# Patient Record
Sex: FEMALE | Race: WHITE | Employment: PART TIME | URBAN - METROPOLITAN AREA
[De-identification: names, ages, dates, MRNs, and addresses within clinical notes are randomized per-mention and may not be internally consistent; named-entity substitution may affect disease eponyms.]

---

## 2019-08-01 ENCOUNTER — HOSPITAL ENCOUNTER (EMERGENCY)
Age: 24
Discharge: HOME | End: 2019-08-01
Payer: COMMERCIAL

## 2019-08-01 VITALS
HEIGHT: 66 IN | RESPIRATION RATE: 16 BRPM | OXYGEN SATURATION: 98 % | HEART RATE: 73 BPM | SYSTOLIC BLOOD PRESSURE: 120 MMHG | TEMPERATURE: 98.3 F | BODY MASS INDEX: 43.39 KG/M2 | DIASTOLIC BLOOD PRESSURE: 81 MMHG | WEIGHT: 270 LBS

## 2019-08-01 DIAGNOSIS — M54.50 ACUTE RIGHT-SIDED LOW BACK PAIN WITHOUT SCIATICA: Primary | ICD-10-CM

## 2019-08-01 PROCEDURE — 99282 EMERGENCY DEPT VISIT SF MDM: CPT

## 2019-08-01 RX ORDER — LIDOCAINE 50 MG/G
1 PATCH TOPICAL DAILY
Qty: 30 PATCH | Refills: 0 | Status: SHIPPED | OUTPATIENT
Start: 2019-08-01 | End: 2020-09-25

## 2019-08-01 RX ORDER — NAPROXEN 500 MG/1
500 TABLET ORAL 2 TIMES DAILY
Qty: 20 TABLET | Refills: 0 | Status: SHIPPED | OUTPATIENT
Start: 2019-08-01 | End: 2019-09-06

## 2019-08-01 RX ORDER — CYCLOBENZAPRINE HCL 10 MG
10 TABLET ORAL 3 TIMES DAILY PRN
Qty: 20 TABLET | Refills: 0 | Status: SHIPPED | OUTPATIENT
Start: 2019-08-01 | End: 2019-08-11

## 2019-08-01 ASSESSMENT — PAIN DESCRIPTION - LOCATION: LOCATION: BACK

## 2019-08-01 ASSESSMENT — ENCOUNTER SYMPTOMS
VOMITING: 0
BACK PAIN: 1
BLOOD IN STOOL: 0
SHORTNESS OF BREATH: 0
DIARRHEA: 0
ABDOMINAL PAIN: 0
NAUSEA: 0

## 2019-08-01 ASSESSMENT — PAIN DESCRIPTION - PAIN TYPE: TYPE: ACUTE PAIN

## 2019-08-01 ASSESSMENT — PAIN DESCRIPTION - ORIENTATION: ORIENTATION: LOWER;MID

## 2019-08-01 ASSESSMENT — PAIN SCALES - GENERAL: PAINLEVEL_OUTOF10: 6

## 2019-08-01 NOTE — ED PROVIDER NOTES
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    HPI:  (Location, Duration, Timing, Severity,Quality, Assoc Sx, Context, Modifying factors)  This is a  26 y.o. female that presents to the emergency department with a chief complaint of right low back pain that began 2 days ago. 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 States shortly after work she began noticing some pain in her back that is worse with certain movements. Rates the pain a 6 out of 10. Denies loss of control of bowel or bladder function, dysuria, hematuria, difficulty urinating, diarrhea, bloody stool. Denies any history of diabetes, kidney disease, recent instrumentation or surgery or history of IV drug use. Denies vomiting, fevers or any other symptoms. Denies any previous issues with her back. PastMedical/Surgical History:  History reviewed. No pertinent past medical history. History reviewed. No pertinent surgical history. Medications:  Discharge Medication List as of 8/1/2019 12:26 PM            Review of Systems:  Review of Systems   Constitutional: Negative for chills and fever. Respiratory: Negative for shortness of breath. Cardiovascular: Negative for chest pain. Gastrointestinal: Negative for abdominal pain, blood in stool, diarrhea, nausea and vomiting. Genitourinary: Negative for difficulty urinating, dysuria and hematuria.

## 2019-09-06 ENCOUNTER — HOSPITAL ENCOUNTER (EMERGENCY)
Age: 24
Discharge: HOME | End: 2019-09-06
Payer: COMMERCIAL

## 2019-09-06 ENCOUNTER — APPOINTMENT (OUTPATIENT)
Dept: GENERAL RADIOLOGY | Age: 24
End: 2019-09-06
Payer: COMMERCIAL

## 2019-09-06 VITALS
SYSTOLIC BLOOD PRESSURE: 120 MMHG | HEART RATE: 96 BPM | HEIGHT: 66 IN | DIASTOLIC BLOOD PRESSURE: 86 MMHG | WEIGHT: 270 LBS | OXYGEN SATURATION: 98 % | TEMPERATURE: 98.2 F | RESPIRATION RATE: 18 BRPM | BODY MASS INDEX: 43.39 KG/M2

## 2019-09-06 DIAGNOSIS — M25.571 CHRONIC PAIN OF RIGHT ANKLE: Primary | ICD-10-CM

## 2019-09-06 DIAGNOSIS — G89.29 CHRONIC PAIN OF RIGHT ANKLE: Primary | ICD-10-CM

## 2019-09-06 PROCEDURE — 99283 EMERGENCY DEPT VISIT LOW MDM: CPT

## 2019-09-06 PROCEDURE — 73610 X-RAY EXAM OF ANKLE: CPT

## 2019-09-06 RX ORDER — NAPROXEN 500 MG/1
500 TABLET ORAL 2 TIMES DAILY PRN
Qty: 20 TABLET | Refills: 0 | Status: SHIPPED | OUTPATIENT
Start: 2019-09-06 | End: 2020-09-25

## 2019-09-06 ASSESSMENT — ENCOUNTER SYMPTOMS
DIARRHEA: 0
ABDOMINAL PAIN: 0
COLOR CHANGE: 0
NAUSEA: 0
CHEST TIGHTNESS: 0
SHORTNESS OF BREATH: 0
VOMITING: 0

## 2019-09-06 ASSESSMENT — PAIN SCALES - GENERAL: PAINLEVEL_OUTOF10: 6

## 2019-09-06 ASSESSMENT — PAIN DESCRIPTION - ORIENTATION: ORIENTATION: RIGHT

## 2019-09-06 ASSESSMENT — PAIN DESCRIPTION - LOCATION: LOCATION: ANKLE

## 2019-09-06 ASSESSMENT — PAIN DESCRIPTION - PAIN TYPE: TYPE: CHRONIC PAIN

## 2019-09-06 NOTE — ED PROVIDER NOTES
**EVALUATED BY ADVANCED PRACTICE PROVIDER**        [address]      Pt Name: Nia Silver  [facility]:[id number]  [place] 1995  Date of evaluation: 9/6/2019  Provider: Samira Downey PA-C      Chief Complaint:    Chief Complaint   Patient presents with    Ankle Pain     right ankle pain x1 month, dneies injury, worse with ambulating       Nursing Notes, Past Medical Hx, Past Surgical Hx, Social Hx, Allergies, and Family Hx were all reviewed and agreed with or any disagreements were addressed in the HPI.    HPI:  (Location, Duration, Timing, Severity,Quality, Assoc Sx, Context, Modifying factors)  This is a  26 y.o. female presents the emergency department difficulty as a pertains to her right ankle. Patient states that she denies that she is had injury or problems with this ankle until approximately 6 weeks ago. She states she has noted persistently over the last month that she has had some achy pain and discomfort. She states it is diffusely over the ankle. She states that it is worse with ambulation. She states sometimes when she has a sensation of increasing levels of pain it feels like if she popped her ankle I feel better. Unfortunately she states that she pops her ankle and it feels worse. She cannot discern where exactly the pain is coming from. She states that is all over. She has pain medially, over the tibiotalar joint, and reports that she has pain laterally. She has not had any evidence of significant ankle joint effusion per her report. She states the symptoms are intermittent. She has not tried medications in the home environment has been wearing a ankle wrap with no benefit. Current level of pain and discomfort rates to be 6 out of 10. Because of the above-mentioned she presents for evaluation and treatment. PastMedical/Surgical History:  History reviewed. No pertinent past medical history. History reviewed.  No lateral malleolus. 1. No acute abnormality. MEDICAL DECISION MAKING / ED COURSE:      PROCEDURES:   Procedures  None    Patient was given:  Medications - No data to display    The patient's detailed history of present illness is documented as above. Upon arrival to the emergency department the patient's vital signs are as documented. The patient is noted to be hemodynamically stable and afebrile. Physical examination findings are as above. Radiographs of the patient's right ankle demonstrate no evidence of acute bony abnormality. On physical exam there is nothing focal.  She unfortunately is been having symptoms that have been persistent for more than a month. I suggested initiation with anti-inflammatories to see if we can control the symptoms related to the pain and then to make arrangements for orthopedic follow-up on an outpatient basis. She is aware there are number of conditions that can exist that we are not seen on plain radiographs that can cause pain and discomfort. She will be given a referral to Dr. Warren Mart. She is walking without significant antalgia and I do not believe that she needs crutches. She can continue with the ankle wrap believes it is beneficial. The patient has been made aware of the signs and symptoms which would necessitate an immediate return to the emergency department and verbalizes an understanding of these signs and symptoms. .I estimate there is low risk for compartment syndrome, deep venous thrombosis, limb-threatening infectious, tendon and/or neurovascular injury and therefore I consider the discharge disposition reasonable. [facility] and I have discussed the diagnosis and risks, and we agree with discharging home to follow-up with their primary care provider and/or the referring orthopedist on call. We also discussed returning to the emergency department immediately if new or worsening symptoms occur.  We have discussed the symptoms which are most

## 2019-10-25 ENCOUNTER — HOSPITAL ENCOUNTER (EMERGENCY)
Age: 24
Discharge: HOME | End: 2019-10-25
Payer: COMMERCIAL

## 2019-10-25 VITALS
RESPIRATION RATE: 18 BRPM | BODY MASS INDEX: 43.39 KG/M2 | HEIGHT: 66 IN | SYSTOLIC BLOOD PRESSURE: 145 MMHG | DIASTOLIC BLOOD PRESSURE: 86 MMHG | HEART RATE: 92 BPM | WEIGHT: 270 LBS | OXYGEN SATURATION: 98 % | TEMPERATURE: 98.2 F

## 2019-10-25 DIAGNOSIS — J20.9 ACUTE BRONCHITIS, UNSPECIFIED ORGANISM: Primary | ICD-10-CM

## 2019-10-25 LAB
RAPID INFLUENZA  B AGN: NEGATIVE
RAPID INFLUENZA A AGN: NEGATIVE

## 2019-10-25 PROCEDURE — 99283 EMERGENCY DEPT VISIT LOW MDM: CPT

## 2019-10-25 PROCEDURE — 6370000000 HC RX 637 (ALT 250 FOR IP): Performed by: PHYSICIAN ASSISTANT

## 2019-10-25 PROCEDURE — 94760 N-INVAS EAR/PLS OXIMETRY 1: CPT

## 2019-10-25 PROCEDURE — 94640 AIRWAY INHALATION TREATMENT: CPT

## 2019-10-25 PROCEDURE — 87804 INFLUENZA ASSAY W/OPTIC: CPT

## 2019-10-25 RX ORDER — ALBUTEROL SULFATE 90 UG/1
2 AEROSOL, METERED RESPIRATORY (INHALATION) EVERY 4 HOURS PRN
Qty: 1 INHALER | Refills: 0 | Status: SHIPPED | OUTPATIENT
Start: 2019-10-25 | End: 2020-09-25

## 2019-10-25 RX ORDER — IPRATROPIUM BROMIDE AND ALBUTEROL SULFATE 2.5; .5 MG/3ML; MG/3ML
1 SOLUTION RESPIRATORY (INHALATION) ONCE
Status: COMPLETED | OUTPATIENT
Start: 2019-10-25 | End: 2019-10-25

## 2019-10-25 RX ORDER — BENZONATATE 100 MG/1
100 CAPSULE ORAL 3 TIMES DAILY PRN
Qty: 30 CAPSULE | Refills: 0 | Status: SHIPPED | OUTPATIENT
Start: 2019-10-25 | End: 2019-11-01

## 2019-10-25 RX ADMIN — IPRATROPIUM BROMIDE AND ALBUTEROL SULFATE 1 AMPULE: .5; 3 SOLUTION RESPIRATORY (INHALATION) at 11:57

## 2019-10-25 ASSESSMENT — ENCOUNTER SYMPTOMS
BACK PAIN: 0
TROUBLE SWALLOWING: 0
RHINORRHEA: 1
SHORTNESS OF BREATH: 0
VOMITING: 0
STRIDOR: 0
WHEEZING: 0
DIARRHEA: 0
COUGH: 1
SORE THROAT: 0
ABDOMINAL PAIN: 0
ABDOMINAL DISTENTION: 0
CONSTIPATION: 0
COLOR CHANGE: 0
NAUSEA: 0
VOICE CHANGE: 0

## 2019-12-11 ENCOUNTER — APPOINTMENT (OUTPATIENT)
Dept: GENERAL RADIOLOGY | Age: 24
End: 2019-12-11
Payer: COMMERCIAL

## 2019-12-11 ENCOUNTER — HOSPITAL ENCOUNTER (EMERGENCY)
Age: 24
Discharge: HOME | End: 2019-12-11
Payer: COMMERCIAL

## 2019-12-11 VITALS
WEIGHT: 284 LBS | DIASTOLIC BLOOD PRESSURE: 84 MMHG | TEMPERATURE: 98.2 F | SYSTOLIC BLOOD PRESSURE: 126 MMHG | BODY MASS INDEX: 45.84 KG/M2 | RESPIRATION RATE: 16 BRPM | HEART RATE: 89 BPM | OXYGEN SATURATION: 98 %

## 2019-12-11 DIAGNOSIS — R07.81 RIB PAIN ON LEFT SIDE: Primary | ICD-10-CM

## 2019-12-11 PROCEDURE — 93005 ELECTROCARDIOGRAM TRACING: CPT | Performed by: EMERGENCY MEDICINE

## 2019-12-11 PROCEDURE — 71046 X-RAY EXAM CHEST 2 VIEWS: CPT

## 2019-12-11 PROCEDURE — 99283 EMERGENCY DEPT VISIT LOW MDM: CPT

## 2019-12-11 RX ORDER — NAPROXEN 500 MG/1
500 TABLET ORAL 2 TIMES DAILY
Qty: 20 TABLET | Refills: 0 | Status: SHIPPED | OUTPATIENT
Start: 2019-12-11 | End: 2020-09-25

## 2019-12-11 RX ORDER — LIDOCAINE 50 MG/G
1 PATCH TOPICAL DAILY
Qty: 30 PATCH | Refills: 0 | Status: SHIPPED | OUTPATIENT
Start: 2019-12-11 | End: 2021-03-14

## 2019-12-11 ASSESSMENT — PAIN DESCRIPTION - LOCATION: LOCATION: RIB CAGE

## 2019-12-11 ASSESSMENT — PAIN SCALES - GENERAL: PAINLEVEL_OUTOF10: 6

## 2019-12-11 ASSESSMENT — PAIN DESCRIPTION - ORIENTATION: ORIENTATION: LEFT

## 2019-12-12 LAB
EKG ATRIAL RATE: 80 BPM
EKG DIAGNOSIS: NORMAL
EKG P AXIS: 26 DEGREES
EKG P-R INTERVAL: 158 MS
EKG Q-T INTERVAL: 370 MS
EKG QRS DURATION: 76 MS
EKG QTC CALCULATION (BAZETT): 426 MS
EKG R AXIS: 41 DEGREES
EKG T AXIS: 43 DEGREES
EKG VENTRICULAR RATE: 80 BPM

## 2019-12-12 PROCEDURE — 93010 ELECTROCARDIOGRAM REPORT: CPT | Performed by: INTERNAL MEDICINE

## 2020-06-16 ENCOUNTER — HOSPITAL ENCOUNTER (EMERGENCY)
Age: 25
Discharge: HOME | End: 2020-06-16
Attending: EMERGENCY MEDICINE
Payer: COMMERCIAL

## 2020-06-16 VITALS
DIASTOLIC BLOOD PRESSURE: 81 MMHG | HEIGHT: 66 IN | BODY MASS INDEX: 45 KG/M2 | SYSTOLIC BLOOD PRESSURE: 139 MMHG | HEART RATE: 81 BPM | OXYGEN SATURATION: 98 % | WEIGHT: 280 LBS | RESPIRATION RATE: 20 BRPM | TEMPERATURE: 97.3 F

## 2020-06-16 LAB
BILIRUBIN URINE: NEGATIVE
BLOOD, URINE: NEGATIVE
CLARITY: ABNORMAL
COLOR: YELLOW
EPITHELIAL CELLS, UA: 2 /HPF (ref 0–5)
GLUCOSE URINE: NEGATIVE MG/DL
HCG(URINE) PREGNANCY TEST: NEGATIVE
HYALINE CASTS: 0 /LPF (ref 0–8)
KETONES, URINE: NEGATIVE MG/DL
LEUKOCYTE ESTERASE, URINE: NEGATIVE
MICROSCOPIC EXAMINATION: YES
NITRITE, URINE: NEGATIVE
PH UA: 5.5 (ref 5–8)
PROTEIN UA: NEGATIVE MG/DL
RBC UA: 2 /HPF (ref 0–4)
SPECIFIC GRAVITY UA: 1.02 (ref 1–1.03)
URINE TYPE: ABNORMAL
UROBILINOGEN, URINE: 0.2 E.U./DL
WBC UA: 2 /HPF (ref 0–5)

## 2020-06-16 PROCEDURE — 99283 EMERGENCY DEPT VISIT LOW MDM: CPT

## 2020-06-16 PROCEDURE — 84703 CHORIONIC GONADOTROPIN ASSAY: CPT

## 2020-06-16 PROCEDURE — 81001 URINALYSIS AUTO W/SCOPE: CPT

## 2020-06-16 RX ORDER — AMOXICILLIN 250 MG
1 CAPSULE ORAL DAILY PRN
Qty: 7 TABLET | Refills: 0 | Status: SHIPPED | OUTPATIENT
Start: 2020-06-16 | End: 2020-06-23

## 2020-06-16 RX ORDER — MAGNESIUM CARB/ALUMINUM HYDROX 105-160MG
150 TABLET,CHEWABLE ORAL 2 TIMES DAILY PRN
Qty: 2 BOTTLE | Refills: 0 | Status: SHIPPED | OUTPATIENT
Start: 2020-06-16 | End: 2020-06-18

## 2020-06-16 ASSESSMENT — PAIN DESCRIPTION - ORIENTATION: ORIENTATION: LEFT

## 2020-06-16 ASSESSMENT — PAIN DESCRIPTION - LOCATION: LOCATION: ABDOMEN

## 2020-06-16 NOTE — ED NOTES
Discharge instructions given, patient acknowledged understanding, rx given x2, patient ambulated out of ed upon discharge with no wants or needs      Nicole Wright RN  06/16/20 4006

## 2020-08-11 ENCOUNTER — HOSPITAL ENCOUNTER (EMERGENCY)
Age: 25
Discharge: HOME | End: 2020-08-11
Attending: EMERGENCY MEDICINE
Payer: COMMERCIAL

## 2020-08-11 VITALS
RESPIRATION RATE: 14 BRPM | HEIGHT: 65 IN | OXYGEN SATURATION: 98 % | SYSTOLIC BLOOD PRESSURE: 128 MMHG | WEIGHT: 280 LBS | HEART RATE: 80 BPM | BODY MASS INDEX: 46.65 KG/M2 | DIASTOLIC BLOOD PRESSURE: 74 MMHG | TEMPERATURE: 98.2 F

## 2020-08-11 PROCEDURE — 6360000002 HC RX W HCPCS: Performed by: EMERGENCY MEDICINE

## 2020-08-11 PROCEDURE — 96372 THER/PROPH/DIAG INJ SC/IM: CPT

## 2020-08-11 PROCEDURE — 99282 EMERGENCY DEPT VISIT SF MDM: CPT

## 2020-08-11 RX ORDER — KETOROLAC TROMETHAMINE 30 MG/ML
30 INJECTION, SOLUTION INTRAMUSCULAR; INTRAVENOUS ONCE
Status: COMPLETED | OUTPATIENT
Start: 2020-08-11 | End: 2020-08-11

## 2020-08-11 RX ADMIN — KETOROLAC TROMETHAMINE 30 MG: 30 INJECTION, SOLUTION INTRAMUSCULAR at 06:33

## 2020-08-11 ASSESSMENT — PAIN SCALES - GENERAL: PAINLEVEL_OUTOF10: 8

## 2020-08-11 NOTE — ED PROVIDER NOTES
%    Place 1 patch onto the skin daily 12 hours on, 12 hours off. LORATADINE-PSEUDOEPHEDRINE (CLARITIN-D 12HR) 5-120 MG PER EXTENDED RELEASE TABLET    Take 1 tablet by mouth 2 times daily    NAPROXEN (NAPROSYN) 500 MG TABLET    Take 1 tablet by mouth 2 times daily as needed for Pain    NAPROXEN (NAPROSYN) 500 MG TABLET    Take 1 tablet by mouth 2 times daily for 20 doses       ALLERGIES     Patient has no known allergies. FAMILY HISTORY     History reviewed. No pertinent family history.        SOCIAL HISTORY       Social History     Socioeconomic History    Marital status: Single     Spouse name: None    Number of children: None    Years of education: None    Highest education level: None   Occupational History    None   Social Needs    Financial resource strain: None    Food insecurity     Worry: None     Inability: None    Transportation needs     Medical: None     Non-medical: None   Tobacco Use    Smoking status: Never Smoker    Smokeless tobacco: Never Used   Substance and Sexual Activity    Alcohol use: Not Currently    Drug use: Never    Sexual activity: None   Lifestyle    Physical activity     Days per week: None     Minutes per session: None    Stress: None   Relationships    Social connections     Talks on phone: None     Gets together: None     Attends Sikh service: None     Active member of club or organization: None     Attends meetings of clubs or organizations: None     Relationship status: None    Intimate partner violence     Fear of current or ex partner: None     Emotionally abused: None     Physically abused: None     Forced sexual activity: None   Other Topics Concern    None   Social History Narrative    None       SCREENINGS           PHYSICAL EXAM    (5+ for level 4, 8+ for level 5)     ED Triage Vitals [08/11/20 0611]   BP Temp Temp Source Pulse Resp SpO2 Height Weight   128/74 98.2 °F (36.8 °C) Oral 80 14 98 % 5' 5\" (1.651 m) 280 lb (127 kg)       Physical Exam  Vitals signs and nursing note reviewed. Constitutional:       General: She is not in acute distress. Appearance: Normal appearance. She is not toxic-appearing or diaphoretic. HENT:      Head: Normocephalic and atraumatic. Right Ear: External ear normal.      Left Ear: External ear normal.      Nose: Nose normal.      Mouth/Throat:      Mouth: Mucous membranes are moist.      Pharynx: Oropharynx is clear. Eyes:      General: No scleral icterus. Right eye: No discharge. Left eye: No discharge. Extraocular Movements: Extraocular movements intact. Conjunctiva/sclera: Conjunctivae normal.      Pupils: Pupils are equal, round, and reactive to light. Neck:      Musculoskeletal: Normal range of motion and neck supple. Cardiovascular:      Pulses: Normal pulses. Pulmonary:      Effort: Pulmonary effort is normal. No respiratory distress. Breath sounds: Normal breath sounds. No stridor. Abdominal:      General: Abdomen is flat. There is no distension. Musculoskeletal: Normal range of motion. General: No swelling, tenderness, deformity or signs of injury. Right lower leg: No edema. Left lower leg: No edema. Skin:     General: Skin is warm and dry. Capillary Refill: Capillary refill takes less than 2 seconds. Coloration: Skin is not jaundiced or pale. Findings: Rash (Pink patch of rash likely a chemical burn-first-degree without blistering underneath both armpits.) present. No bruising, erythema or lesion. Neurological:      General: No focal deficit present. Mental Status: She is alert and oriented to person, place, and time. Cranial Nerves: No cranial nerve deficit. Sensory: No sensory deficit. Motor: No weakness. Psychiatric:         Mood and Affect: Mood normal.         Behavior: Behavior normal.         Thought Content:  Thought content normal.         Judgment: Judgment normal.         DIAGNOSTIC RESULTS RADIOLOGY (Per Emergency Physician): Interpretation per the Radiologist below, if available at the time of this note:  No results found. LABS:  Labs Reviewed - No data to display    All other labs were within normal range or not returned as of this dictation. EMERGENCY DEPARTMENT COURSE and DIFFERENTIAL DIAGNOSIS/MDM:   Vitals:    Vitals:    08/11/20 0611   BP: 128/74   Pulse: 80   Resp: 14   Temp: 98.2 °F (36.8 °C)   TempSrc: Oral   SpO2: 98%   Weight: 280 lb (127 kg)   Height: 5' 5\" (1.651 m)       Medications   ketorolac (TORADOL) injection 30 mg (30 mg Intramuscular Given 8/11/20 4379)       MDM. Patient likely with a chemical burn from a depilatory cream.  Patient is already irrigated and washed off with soap and water. Recommend that she continue using Neosporin. She is reporting pain here as a Toradol to be given. Recommend NSAID and Tylenol at home. Patient given strict return precautions. Instructed not to use the cream anymore. Patient instructed to follow up with their primary care doctor in one-two days and return to the emergency department if worsening of the condition or any other concerns. Please see discharge instructions for further delineation regarding the specific discharge instructions explained and given to the patient. CONSULTS:  None    PROCEDURES:  Unless otherwise noted below, none     Procedures    FINAL IMPRESSION      1.  Corrosion of first degree of axilla, unspecified laterality, initial encounter          DISPOSITION/PLAN   DISPOSITION Decision To Discharge 08/11/2020 06:27:48 AM      PATIENT REFERRED TO:  Manolo Gregory MD  [address] [facility] [address]  [facility]  [phone]    Schedule an appointment as soon as possible for a visit       [facility] Emergency Department  [address] [place] [address]  [phone]    If symptoms worsen      DISCHARGE MEDICATIONS:  New Prescriptions    No medications on file (Please note:  Portions of this note were completed with a voice recognition program. Efforts were made to edit the dictations but occasionally words and phrases are mis-transcribed.)    Form v2016. J.[id number]    Hudson Brown DO (electronically signed)  Emergency Medicine Provider              Russell Calzada DO  08/11/20 8102

## 2020-09-25 ENCOUNTER — HOSPITAL ENCOUNTER (EMERGENCY)
Age: 25
Discharge: HOME | End: 2020-09-25
Payer: COMMERCIAL

## 2020-09-25 ENCOUNTER — APPOINTMENT (OUTPATIENT)
Dept: GENERAL RADIOLOGY | Age: 25
End: 2020-09-25
Payer: COMMERCIAL

## 2020-09-25 VITALS
RESPIRATION RATE: 16 BRPM | SYSTOLIC BLOOD PRESSURE: 125 MMHG | HEIGHT: 66 IN | OXYGEN SATURATION: 100 % | HEART RATE: 82 BPM | DIASTOLIC BLOOD PRESSURE: 57 MMHG | WEIGHT: 290 LBS | TEMPERATURE: 97.9 F | BODY MASS INDEX: 46.61 KG/M2

## 2020-09-25 PROCEDURE — 73110 X-RAY EXAM OF WRIST: CPT

## 2020-09-25 PROCEDURE — 99283 EMERGENCY DEPT VISIT LOW MDM: CPT

## 2020-09-25 PROCEDURE — 6370000000 HC RX 637 (ALT 250 FOR IP): Performed by: NURSE PRACTITIONER

## 2020-09-25 RX ORDER — NAPROXEN 500 MG/1
500 TABLET ORAL 2 TIMES DAILY PRN
Qty: 60 TABLET | Refills: 0 | Status: SHIPPED | OUTPATIENT
Start: 2020-09-25 | End: 2021-01-22

## 2020-09-25 RX ORDER — IBUPROFEN 800 MG/1
800 TABLET ORAL ONCE
Status: COMPLETED | OUTPATIENT
Start: 2020-09-25 | End: 2020-09-25

## 2020-09-25 RX ADMIN — IBUPROFEN 800 MG: 800 TABLET, FILM COATED ORAL at 19:48

## 2020-09-25 ASSESSMENT — PAIN DESCRIPTION - PAIN TYPE: TYPE: ACUTE PAIN

## 2020-09-25 ASSESSMENT — PAIN DESCRIPTION - ORIENTATION: ORIENTATION: LEFT

## 2020-09-25 ASSESSMENT — PAIN DESCRIPTION - LOCATION: LOCATION: WRIST

## 2020-09-25 ASSESSMENT — PAIN SCALES - GENERAL
PAINLEVEL_OUTOF10: 7
PAINLEVEL_OUTOF10: 7

## 2020-09-25 ASSESSMENT — ENCOUNTER SYMPTOMS
NAUSEA: 0
ABDOMINAL PAIN: 0
CHEST TIGHTNESS: 0
DIARRHEA: 0
SHORTNESS OF BREATH: 0
VOMITING: 0

## 2020-09-25 ASSESSMENT — PAIN DESCRIPTION - DESCRIPTORS: DESCRIPTORS: ACHING;THROBBING

## 2020-09-25 NOTE — ED PROVIDER NOTES
[facility]        Pt Name: Kuwlinder Jacobs  MRN: [id number]  [place] 1995  Date of evaluation: 9/25/2020  Provider: SOCORRO Jordan - CNP  PCP: Sandra Barcenas MD    ROSIE. I have evaluated this patient. My supervising physician was available for consultation. CHIEF COMPLAINT       Chief Complaint   Patient presents with    Wrist Pain     Pt says,\" hurt left wrist 10 years had sx and I have a pin and today I twisted it at work. \"       HISTORY OF PRESENT ILLNESS   (Location, Timing/Onset, Context/Setting, Quality, Duration, Modifying Factors, Severity, Associated Signs and Symptoms)  Note limiting factors. Kulwinder Jacobs is a 27 y.o. female who presents to the emergency department with complaint of left wrist pain that started this morning. Patient describes the pain as a constant aching. Patient believes that she may have twisted it at work but is unsure. Reports having a pin placement into the left wrist 10 years ago and states that the pain has been off and on since then. Patient denies taking any over-the-counter medications at home for pain relief but has tried a wrist brace and states that has helped alleviate the pain \"a little bit\". Patient reports that moving the wrist makes the pain worse. Patient reports that she has never had physical therapy for the wrist and believes that this may be contributing to why she has had pain off and on for the past 10 years since the surgery. Denies any headache, fever, lightheadedness, dizziness, visual disturbances. No chest pain or pressure. No neck or back pain. No shortness of breath, cough, or congestion. No abdominal pain, nausea, vomiting, diarrhea, constipation, or dysuria. No rash. Nursing Notes were all reviewed and agreed with or any disagreements were addressed in the HPI.     REVIEW OF SYSTEMS    (2-9 systems for level 4, 10 or more for level 5)     Review of Systems   Constitutional: Negative for activity change, chills and fever. Respiratory: Negative for chest tightness and shortness of breath. Cardiovascular: Negative for chest pain. Gastrointestinal: Negative for abdominal pain, diarrhea, nausea and vomiting. Genitourinary: Negative for dysuria. Musculoskeletal:        Wrist pain   All other systems reviewed and are negative. Positives and Pertinent negatives as per HPI. Except as noted above in the ROS, all other systems were reviewed and negative. PAST MEDICAL HISTORY     Past Medical History:   Diagnosis Date    Kidney stone          SURGICAL HISTORY     Past Surgical History:   Procedure Laterality Date    WRIST SURGERY      pin palcement         CURRENTMEDICATIONS       Discharge Medication List as of 9/25/2020  8:24 PM      CONTINUE these medications which have NOT CHANGED    Details   lidocaine (LIDODERM) 5 % Place 1 patch onto the skin daily 12 hours on, 12 hours off., Disp-30 patch, R-0Print               ALLERGIES     Patient has no known allergies. FAMILYHISTORY     History reviewed. No pertinent family history. SOCIAL HISTORY       Social History     Tobacco Use    Smoking status: Never Smoker    Smokeless tobacco: Never Used   Substance Use Topics    Alcohol use: Not Currently    Drug use: Never       SCREENINGS             PHYSICAL EXAM    (up to 7 for level 4, 8 or more for level 5)     ED Triage Vitals   BP Temp Temp Source Pulse Resp SpO2 Height Weight   09/25/20 1922 09/25/20 1918 09/25/20 1918 09/25/20 1918 09/25/20 1918 09/25/20 1918 09/25/20 1918 09/25/20 1918   (!) 125/57 97.9 °F (36.6 °C) Oral 82 16 100 % 5' 6\" (1.676 m) 290 lb (131.5 kg)       Physical Exam  Vitals signs and nursing note reviewed. Constitutional:       Appearance: She is well-developed. She is not diaphoretic. HENT:      Head: Normocephalic and atraumatic.       Right Ear: External ear normal.      Left Ear: m)        Patient was given the following medications:  Medications   ibuprofen (ADVIL;MOTRIN) tablet 800 mg (800 mg Oral Given 9/25/20 1948)           Briefly, this is a 22-year-old female who presents to the emergency department with complaint of left wrist pain that started this morning. Patient describes the pain as a constant aching. Patient believes that she may have twisted it at work but is unsure. Reports having a pin placement into the left wrist 10 years ago and states that the pain has been off and on since then. Patient denies taking any over-the-counter medications at home for pain relief but has tried a wrist brace and states that has helped alleviate the pain \"a little bit\". Patient reports that moving the wrist makes the pain worse. Patient reports that she has never had physical therapy for the wrist and believes that this may be contributing to why she has had pain off and on for the past 10 years since the surgery. Xray left wrist shows no acute osseous injury of the left wrist.     Patient updated on x-ray results. Given referral for physical therapy, naproxen for pain and inflammation, L wrist brace for comfort, and follow-up with orthopedic as needed. Patient agreeable to plan at this time. I estimate there is LOW risk for FRACTURE, COMPARTMENT SYNDROME, DEEP VENOUS THROMBOSIS, SEPTIC ARTHRITIS, TENDON OR NEUROVASCULAR INJURY, thus I consider the discharge disposition reasonable. FINAL IMPRESSION      1.  Left wrist pain          DISPOSITION/PLAN   DISPOSITION Decision To Discharge 09/25/2020 08:21:10 PM      PATIENT REFERREDTO:  Diandra Jorge MD  [address] [place] [address], [address]  [phone]    Schedule an appointment as soon as possible for a visit   As needed      DISCHARGE MEDICATIONS:  Discharge Medication List as of 9/25/2020  8:24 PM          DISCONTINUED MEDICATIONS:  Discharge Medication List as of 9/25/2020  8:24 PM      STOP taking these medications       loratadine-pseudoephedrine (CLARITIN-D 12HR) 5-120 MG per extended release tablet Comments:   Reason for Stopping:         albuterol sulfate HFA (PROVENTIL HFA) 108 (90 Base) MCG/ACT inhaler Comments:   Reason for Stopping:                      (Please note that portions of this note were completed with a voice recognition program.  Efforts were made to edit the dictations but occasionally words are mis-transcribed.)    SOCORRO Quijano CNP (electronically signed)         SOCORRO Quijano CNP  09/25/20 3903

## 2020-09-26 NOTE — ED NOTES
Nursing Discharge Notes:  -Patient discharged at this time in no acute distress after verbalizing understanding of discharge instructions.  -A copy of the AVS was reviewed with pt.  -Pt received applicable scripts which were reviewed with pt by this RN. -Pt was given the opportunity to ask questions before signing for discharge.  -left brace applied.     -Pt left ambulatory to lobby / discharge area. Patient Education:  Learner - Patient. Motivation and Readiness To Learn - Medium to High  Barriers To Learning - None  Learning Preference / Provided Instructions - Both written and verbal discharge instructions.        Mila Nixon RN  09/25/20 8080

## 2020-10-06 ENCOUNTER — HOSPITAL ENCOUNTER (OUTPATIENT)
Dept: OCCUPATIONAL THERAPY | Age: 25
Setting detail: THERAPIES SERIES
Discharge: HOME | End: 2020-10-06
Payer: COMMERCIAL

## 2020-10-06 PROCEDURE — 97022 WHIRLPOOL THERAPY: CPT

## 2020-10-06 PROCEDURE — 97165 OT EVAL LOW COMPLEX 30 MIN: CPT

## 2020-10-06 PROCEDURE — 97530 THERAPEUTIC ACTIVITIES: CPT

## 2020-10-06 NOTE — PROGRESS NOTES
Exercise Program:   Patient instructed in use of kinesiotape for mechanical correction of wrist , patient verbalized understanding after demonstration provided. Patient also educated to complete short arc arom into supination of forearm and isometric strengthening into supination of forearm. Patient provided with demonstration and then she was able to complete independently. Therapeutic Exercise and NMR:  [x] (52198) Provided verbal/tactile cueing for activities related to strengthening, flexibility, endurance, ROM  for improvements in scapular, scapulothoracic and UE control with self care, reaching, carrying, lifting, house/yardwork, driving/computer work.    [] (06676) Provided verbal/tactile cueing for activities related to improving balance, coordination, kinesthetic sense, posture, motor skill, proprioception  to assist with  scapular, scapulothoracic and UE control with self care, reaching, carrying, lifting, house/yardwork, driving/computer work.   [] Comments:    Therapeutic Activities:    [] (77839 or 86299) Provided verbal/tactile cueing for activities related to improving balance, coordination, kinesthetic sense, posture, motor skill, proprioception and motor activation to allow for proper function of scapular, scapulothoracic and UE control with self care, carrying, lifting, driving/computer work  [] Comments:    Home Exercise Program:    [] (13207) Reviewed/Progressed HEP activities related to strengthening, flexibility, endurance, ROM of scapular, scapulothoracic and UE control with self care, reaching, carrying, lifting, house/yardwork, driving/computer work  [] (76194) Reviewed/Progressed HEP activities related to improving balance, coordination, kinesthetic sense, posture, motor skill, proprioception of scapular, scapulothoracic and UE control with self care, reaching, carrying, lifting, house/yardwork, driving/computer work    [] Comments:    Manual Treatments:  PROM / STM / Oscillations-Mobs:  G-I, II, III, IV (PA's, Inf., Post.)  [] (08360) Provided manual therapy to mobilize soft tissue/joints of cervical/CT, scapular GHJ and UE for the purpose of modulating pain, promoting relaxation,  increasing ROM, reducing/eliminating soft tissue swelling/inflammation/restriction, improving soft tissue extensibility and allowing for proper ROM for normal function with self care, reaching, carrying, lifting, house/yardwork, driving/computer work  [] Comments:    ADL Training:  [] (79623) Provided self-care/home management training related to activities of daily living and compensatory training, and/or use of adaptive equipment   [] Comments:     Splinting:  [] Fabrication of:   [] (15166) Orthotic/Prosthetic Management, subsequent encounter  [] (07512) Orthotic management and training (fitting and assessment)  [] Comments:    Modalities: evaluation and fluidotherapy    Charges:  Timed Code Treatment Minutes: 15   Total Treatment Minutes: 45     [x] EVAL (LOW) 41616   [] OT Re-eval (79996)  [] EVAL (MOD) 55704   [] EVAL (HIGH) 57670       [] Erika (16675) x     [] TJGJG(06985)  [] NMR (53484) x     [] Estim (attended) (69785)   [] Manual ([id number]) x     [] US (22774)  [x] TA (96161) x   1  [] Paraffin (59584)  [] ADL  (41874) x    [] Splint/L code:    [] Estim (unattended) ([id number])  [x] Fluidotherapy (63915)  [] Other:    GOALS: Patient stated goal: patient to be pain free at work  []? Progressing: []? Met: []? Not Met: []? Adjusted     Therapist goals for Patient:   Short Term Goals: To be achieved in: 30 days  1. Pt will be  Pain free during sign language  []? Progressing: []? Met: []? Not Met: []? Adjusted  2. Pt will be pain free with use of wrist mechanical correction tape at work for 3 hour shift  []? Progressing: []? Met: []? Not Met: []? Adjusted     Long Term Goals: To be achieved by dishcharge  1.  Pt will will report a QuickDASH Symptom Severity Scale score of 20% or less indicating increased safety and functional independence in desired occupational pursuits by discharge. []? Progressing: []? Met: []? Not Met: []? Adjusted    Progression Towards Functional goals:  [] Patient is progressing as expected towards functional goals listed. [] Progression is slowed due to complexities listed. [] Progression has been slowed due to co-morbidities. [x] Plan just implemented, too soon to assess goals progression  [] All goals are met  [] Other:     ASSESSMENT:  See eval    Treatment/Activity Tolerance:  [x] Patient tolerated treatment well [] Patient limited by fatique  [] Patient limited by pain  [] Patient limited by other medical complications  [] Other:     Prognosis: [x] Good [] Fair  [] Poor    Patient Requires Follow-up: [x] Yes  [] No    PLAN: See eval  [] Continue per plan of care [] Alter current plan (see comments)  [x] Plan of care initiated [] Hold pending MD visit [] Discharge    Electronically signed by: Alexa Adkins    Note: If patient does not return for scheduled/ recommended follow up visits, this note will serve as a discharge from care along with most recent update on progress.

## 2020-10-06 NOTE — PROGRESS NOTES
[address], [address] [facility], [address]  Phone: [phone]   Fax: [phone]                                                     Occupational Therapy Certification    Dear Narinder De Leon, Jenni Wells family doctor  ,    We had the pleasure of evaluating the following patient for occupational therapy services at [facility]. A summary of our findings can be found in the initial assessment below. This includes our plan of care. If you have any questions or concerns regarding these findings, please do not hesitate to contact me at the office phone number checked above. Thank you for the referral.       Referring Practitioner Signature:_______________________________Date:__________________  By signing above (or electronic signature), therapists plan is approved by the referring practicioner      Patient: Betty Walter   : 1995   MRN: [id number]  Referring Practicioner:       Evaluation Date: 10/6/2020      Medical Diagnosis Information: Old wrist injury requiring pinning on proximal ulna, osteochrondromatosis                                                   Insurance information care soruce:       Precautions/ Contra-indications:    Latex Allergy:  [x]NO      []YES  Preferred Language for Healthcare:   [x]English       []other:  OCCUPATIONAL PROFILE  Reason for Seeking OT Services and Patient Goals:  Decrease pain and increase strength    Personal Interests and Values:  Enjoys drawing,   Likes signing with deaf people. 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 Performance Patterns (Routines, Roles, Rituals, & Habits):    Works part time and goes to school part time    Physical and [facility] (which aide or inhibit performance in desired occupations):   Would like to be able to sign pain free and be able to lift up to 10 pounds    Personal, Temporal, and Virtual Contexts (which aide or inhibit performance in desired occupations):      SUBJECTIVE: Patient stated complaint:     Pain Scale: 5/10  Easing factors:  Nothing     Provocative factors:  Ulnar deviation and supination    Type: [x]Constant   []Intermittent  []Radiating []Localized []other:       OBJECTIVE:   Hand dominance: right    Inspection:  Edema present on left wrist 19 cm vs 18 cm on right, noted pinky in slight flexion at rest at pipj, can extend pip with difficulty    Palpation:      Bandages/Dressings/Incisions scar site ulnar border of hand, palpable scar tissue over distal ulna    ROM WFL: []Yes []No (if checked, see below)     PROM AROM    L R L R   Shoulder Flexion        Shoulder Abduction        Shoulder External Rotation        Shoulder Internal Rotation        Elbow Flexion        Elbow Extension        Pronation     Wnl, painful end rom   Supination        Wrist Flexion        Wrist Extension    60 75   Radial Deviation        Ulnar Deviation       CMC Abduction       5th Digit MCP Extension-Flexion       4th Digit MCP Extension-Flexion       3rd Digit MCP Extension-Flexion       2nd Digit MCP Extension-Flexion       1st Digit MCP Extension-Flexion       5th Digit PIP Extension- Flexion       4th Digit PIP Extension-Flexion       3rd Digit PIP Extension-Flexion       2nd Digit PIP Extension-Flexion       1st Digit IP Extension-Flexion       5th Digit DIP Extension-Flexion       4th Digit DIP Extension-Flexion       3rd Digit DIP Extension-Flexion       2nd Digit DIP Extension-Flexion       Composite Flexion (Tip of 3rd Digit to Distal Palmar Crease (cm))         Joint mobility:     [x]Normal    []Hypo   []Hyper    Strength WFL: []Yes []No (if checked, see below)    Strength (0-5) Left Right    Shoulder Flex     Shoulder Abd     Shoulder ER     Shoulder IR     Biceps Triceps     Pronation      Supination      Wrist Flex     Wrist Ext     Wrist Radial Deviation         Orthopaedic Special Tests Positive  Negative  NT Comments    Shoulder        Empty Can              Elbow        Cozen's       Tinel's               Hand/Wrist       Finkelstein's       Tinel's       Phalen's       Froment's       Estill Sign       [place] Deformity       [place] Deformity       Heberden's Nodes (DIP)       Torie's Nodes (PIP)       Mallet Finger       Grind Test Sapna Kearns)                      Hand Assessment Left  Right  Comments    9 Hole Peg Test (s)       Strength (lbs)      Lateral Pinch Strength (lbs)      Palmar Pinch Strength (lbs)      Tip Pinch Strength (lbs)      Opposition (Y/N)      Functional Outcome Measures:        quick dash   raw score 42 72% disability                                  Sensation: :mild ulnar side of hand    Coordination: slightly slow due to swelling in wrist        Cognition:wnl                  [x] Patient history, allergies, meds reviewed. Medical chart reviewed. See intake form. Review Of Systems (ROS):  [x]Performed Review of systems (Integumentary, CardioPulmonary, Neurological) by intake and observation. Intake form has been scanned into medical record. Patient has been instructed to contact their primary care physician regarding ROS issues if not already being addressed at this time.       Co-morbidities/Complexities (which will affect course of rehabilitation):   []None           Arthritic conditions   []Rheumatoid arthritis (M05.9)  []Osteoarthritis (M19.91)   Cardiovascular conditions   []Hypertension (I10)  []Hyperlipidemia (E78.5)  []Angina pectoris (I20)  []Atherosclerosis (I70)   Musculoskeletal conditions   []Disc pathology   []Congenital spine pathologies   []Prior surgical intervention  []Osteoporosis (M81.8)  []Osteopenia (M85.8)  osteochrondromatosis        Endocrine conditions   []Hypothyroid (E03.9)  []Hyperthyroid Gastrointestinal conditions   []Constipation (V43.00)   Metabolic conditions   []Morbid obesity (E66.01)  []Diabetes type 1(E10.65) or 2 (E11.65)   []Neuropathy (G60.9)     Pulmonary conditions   []Asthma (J45)  []Coughing   []COPD (J44.9)   Psychological Disorders  []Anxiety (F41.9)  []Depression (F32.9)   []Other:   [x]Other:          Barriers to/and or personal factors that will affect rehab potential:              []Age  []Sex    []Smoker              []Motivation/Lack of Motivation                        [x]Co-Morbidities              []Cognitive Function, education/learning barriers              []Environmental, home barriers              []profession/work barriers  []past PT/medical experience  []other:  Justification: decreased independence with IADL pursuits due to wrist pain     Falls Risk Assessment (30 days):    [x] Falls risk assessed and no intervention required. [] Falls risk assessed (via clinical reasoning) and patient requires intervention due to being higher risk for falls  [] Falls education provided, including       ASSESSMENT: The pt has chief complaints of  Pain and decreased strength in left forearm , wrist.    These symptoms as well as client factor and performance skill deficits create barriers to the patient engaging in desired occupational pursuits. Therefore, the patient is in need of skilled occupational therapy services to mitigate functional deficits in order to allow the patient to return to baseline, prevent further decline, and/or compensate for decreased functional abilities.       Functional Impairments and Activity Limitations (from functional questionnaire, intake, and interview)    []Reduced participation in functional mobility   []Reduced cognition   []Reduced participation in high level IADLs   [x]Reduced participation ADLs   []Reduced endurance  [x]Reduced fine motor control   []Reduced ROM   [x]Reduced sensation   []Reduced participation ADLs   []Reduced coordination  []Reduced strength   []Reduced balance   []Reduced posture   []Reduced safety awareness   []Reduced functional vision      Participation Restrictions   none   Prognosis/Rehab Potential:      []Excellent   [x]Good    []Fair   []Poor    Tolerance of evaluation/treatment:    []Excellent   [x]Good    []Fair   []Poor    Occupational Therapy Evaluation Complexity Justification   [x] A Occupational Profile/Medical and Therapy History  [x] no personal factors and/or comorbidities that impact the plan of care; brief history relating to presenting problem  []1-2 personal factors and/or comorbidities that impact the plan of care; additional review of physical, cognitive, or psychosocial performance  []3 personal factors and/or comorbidities that impact the plan of care; extensive review of physical cognitive, psychosocial performance  [x] Patient Assessment:  [x] a total of 1-3 performance deficits relating to physical, cognitive, psychosocial limitations/restrictions  [] a total of 3-5 performance deficits relating to physical, cognitive, psychosocial limitations/restrictions  [] a total of 5 or more performance deficits relating to physical, cognitive, psychosocial limitations/restrictions  [x] A clinical presentation with:  [x] low complexity, limited amount of treatment options no assessment modification, no co-morbidities  [] moderate analytical complexity, detailed assessments, minimal to moderate modification of assessments, may have co-morbidities  [] high analytic complexity, comprehensive assessments, multiple treatment options, significant modifications of assessment, co-morbidities affecting performance  [x] Clinical decision making of [x] low , [] moderate, [] high complexity using standardized patient assessment instrument and/or measurable assessment of functional outcome.     [x] EVAL (LOW) 27608 (typically 15 minutes face-to-face)  [] EVAL (MOD) 38028 (typically 30 minutes face-to-face)  [] EVAL (HIGH) 63580 (typically 45 minutes face-to-face)  [] RE-EVAL 77183    PLAN:  Frequency/Duration:  2  days per week for 6 Weeks:  INTERVENTIONS:  [x] Strength training, ROM, balance training, functional mobility training  [x] Neuromuscular re-education and positioning  [x] Manual therapy including soft tissue mobilization and joint manipululations  [x] Modalities as needed that may include: E-stim, Ultrasound, Fluidotherapy, Iontophoresis as indicated, Paraffin, Hot Packs, Cold Packs  [x] Patient/caregiver education on joint protection, postural re-education, activity modification, progression of HEP. [x] Patient/caregiver education regarding home management and safety as well as ADL and IADL performance  [x] Cognitive re-orientation and training  [x] Manual therapy including soft tissue mobilization, manual lymph drainage, and joint manipululations  [x] Adaptive Equipment Education and Training  [x] Splinting including custom or pre-fabricated    HEP instruction: Written and verbal HEP instructions provided and reviewed:   Patient instructed in use of kinesiotape for mechanical correction of wrist , patient verbalized understanding after demonstration provided. Patient also educated to complete short arc arom into supination of forearm and isometric strengthening into supination of forearm. Patient provided with demonstration and then she was able to complete independently. GOALS:  Patient stated goal: patient to be pain free at work  [] Progressing: [] Met: [] Not Met: [] Adjusted    Therapist goals for Patient:   Short Term Goals: To be achieved in: 30 days  1. Pt will be  Pain free during sign language  [] Progressing: [] Met: [] Not Met: [] Adjusted  2. Pt will be pain free with use of wrist mechanical correction tape at work for 3 hour shift  [] Progressing: [] Met: [] Not Met: [] Adjusted    Long Term Goals: To be achieved by ana  1.  Pt will will report a QuickDASH Symptom Severity Scale score of 20% or less indicating increased safety and functional independence in desired occupational pursuits by discharge.   [] Progressing: [] Met: [] Not Met: [] Adjusted  Electronically signed by:  Merlinda Leavell, OT

## 2020-10-08 ENCOUNTER — HOSPITAL ENCOUNTER (OUTPATIENT)
Dept: OCCUPATIONAL THERAPY | Age: 25
Setting detail: THERAPIES SERIES
Discharge: HOME | End: 2020-10-08
Payer: COMMERCIAL

## 2020-10-08 PROCEDURE — 97110 THERAPEUTIC EXERCISES: CPT

## 2020-10-08 PROCEDURE — 97140 MANUAL THERAPY 1/> REGIONS: CPT

## 2020-10-08 PROCEDURE — 97018 PARAFFIN BATH THERAPY: CPT

## 2020-10-08 NOTE — FLOWSHEET NOTE
[facility] - Outpatient Occupational Therapy      Hand Therapy Daily Treatment Note  Date:  10/8/2020    Patient: Maricruz Steward   : 1995   MRN: [id number]  Referring Physician:         Medical Diagnosis Information: wrist pain   Old wrist injury requiring pinning on proximal ulna, osteochrondromatosis                                                 Insurance information:  [facility]  Date of Injury: 10 years ago  Date of Surgery: 10 years ago    Progress Report: []  Yes  [x]  No     Date Range for reporting period:  Beginning: 10/6/2020  Endin2020    Progress report due (10 Rx/or 30 days whichever is less): visit #10 or 80/3/4283    Recertification due (POC duration/ or 90 days whichever is less): visit #12 or 2021    Visit # Insurance Allowable Auth required? Date Range    []  Yes  [x]  No 10/6 to 2021       Latex Allergy:  [x]No      []Yes  Pacemaker:  [] No       [] Yes     Preferred Language for Healthcare:   [x]English       []other:    Pain level: 3-10     SUBJECTIVE:  Pt worked today at her job at [employer], so she reports her wrist is a bit sore. Pt presented with kinesio tape for mechanical correction in place after self-application with correct application noted; removed for tx. Functional Disability Index: Quick DASH: raw score 42 72% disability    OBJECTIVE: See eval      RESTRICTIONS/PRECAUTIONS: hardware present in ulnar aspect of wrist    Exercises/Interventions:  Session initiated with paraffin bath for soft tissue benefits and pain relief. Pt received STM and brief scar massage for desensitization with pt education on using wash cloth at area as pt does not currently like to touch the scar as it \"feels weird. \" Pt received gentle mobilization of wrist at carpals for slight distraction with carpal spreading, progressing into wrist flexion/extension with pt reporting wrist felt less tight with increased pain-free ROM.  Pt then received gentle mobilization for radioulnar glide into short arc pronation/supination, followed by ulnar and radial deviation. For strengthening, pt completed TE below with manual support provided at wrist to prevent pain. Pt also completed therapist-resisted finger flexion/extension with decreased ROM and strength in 5th digit. Next, pt completed wrist maze for ROM and control with pt reporting decreased pain and increased ROM with ulnar deviation and supination required during task. Discussed use of ice following tx or long work days when wrist is sore/aching and use of heat prior to stretching with pt verbalizing understanding. Session concluded with kinesio taping for wrist support and mechanical correction. Therapeutic Exercises  Resistance / level Sets/sec Reps Notes   Finger extension/abduction Yellow web 1 6    Finger flexion/adduction Yellow web 1 6                                                                   Neuromuscular Re-ed / Therapeutic Activities                                                 Manual Intervention                                                     Patient education:     · Patient instructed in use of kinesiotape for mechanical correction of wrist , patient verbalized understanding after demonstration provided. Patient also educated to complete short arc arom into supination of forearm and isometric strengthening into supination of forearm. Patient provided with demonstration and then she was able to complete independently. Home Exercise Program:   Patient instructed in use of kinesiotape for mechanical correction of wrist , patient verbalized understanding after demonstration provided. Patient also educated to complete short arc arom into supination of forearm and isometric strengthening into supination of forearm. Patient provided with demonstration and then she was able to complete independently.        Therapeutic Exercise and NMR:  [x] (88816) Provided verbal/tactile work  [] Comments:    ADL Training:  [] (07559) Provided self-care/home management training related to activities of daily living and compensatory training, and/or use of adaptive equipment   [] Comments:     Splinting:  [] Fabrication of:   [] (96484) Orthotic/Prosthetic Management, subsequent encounter  [] (05614) Orthotic management and training (fitting and assessment)  [] Comments:    Modalities:   8 min paraffin     Charges:  Timed Code Treatment Minutes: 37   Total Treatment Minutes: 45     [] EVAL (LOW) 36920   [] OT Re-eval (93332)  [] EVAL (MOD) 47316   [] EVAL (HIGH) 21605       [x] Erika (91101) x   1  [] VALRF(20225)  [] NMR (38862) x     [] Estim (attended) (19731)   [x] Manual ([id number]) x   1  [] US (19894)  [] TA (59366) x     [x] Paraffin (90838)  [] ADL  ([phone] [id number]) x    [] Splint/L code:    [] Estim (unattended) ([id number])  [] Fluidotherapy (79055)  [] Other:    GOALS: Patient stated goal: patient to be pain free at work  [x]? Progressing: []? Met: []? Not Met: []? Adjusted     Therapist goals for Patient:   Short Term Goals: To be achieved in: 30 days  1. Pt will be  Pain free during sign language  [x]? Progressing: []? Met: []? Not Met: []? Adjusted  2. Pt will be pain free with use of wrist mechanical correction tape at work for 3 hour shift  [x]? Progressing: []? Met: []? Not Met: []? Adjusted     Long Term Goals: To be achieved by dishcharge  1. Pt will will report a QuickDASH Symptom Severity Scale score of 20% or less indicating increased safety and functional independence in desired occupational pursuits by discharge. [x]? Progressing: []? Met: []? Not Met: []? Adjusted    Progression Towards Functional goals:  [x] Patient is progressing as expected towards functional goals listed. [] Progression is slowed due to complexities listed. [] Progression has been slowed due to co-morbidities.   [] Plan just implemented, too soon to assess goals progression  [] All goals are met  [] Other: ASSESSMENT:  See eval    Treatment/Activity Tolerance:  [x] Patient tolerated treatment well [] Patient limited by fatique  [] Patient limited by pain  [] Patient limited by other medical complications  [] Other:     Prognosis: [x] Good [] Fair  [] Poor    Patient Requires Follow-up: [x] Yes  [] No    PLAN: See eval  [x] Continue per plan of care [] Alter current plan (see comments)  [] Plan of care initiated [] Hold pending MD visit [] Discharge    Electronically signed by: Nish Penaloza OTR/L [id number]       Note: If patient does not return for scheduled/ recommended follow up visits, this note will serve as a discharge from care along with most recent update on progress.

## 2020-10-13 ENCOUNTER — HOSPITAL ENCOUNTER (OUTPATIENT)
Dept: OCCUPATIONAL THERAPY | Age: 25
Setting detail: THERAPIES SERIES
Discharge: HOME | End: 2020-10-13
Payer: COMMERCIAL

## 2020-10-13 PROCEDURE — 97140 MANUAL THERAPY 1/> REGIONS: CPT

## 2020-10-13 PROCEDURE — 97018 PARAFFIN BATH THERAPY: CPT

## 2020-10-13 PROCEDURE — 97110 THERAPEUTIC EXERCISES: CPT

## 2020-10-13 NOTE — FLOWSHEET NOTE
[facility] - Outpatient Occupational Therapy      Hand Therapy Daily Treatment Note  Date:  10/13/2020    Patient: Corey Wolff   : 1995   MRN: [id number]  Referring Physician:         Medical Diagnosis Information: wrist pain   Old wrist injury requiring pinning on proximal ulna, osteochrondromatosis                                                 Insurance information:  [facility]  Date of Injury: 10 years ago  Date of Surgery: 10 years ago    Progress Report: []  Yes  [x]  No     Date Range for reporting period:  Beginning: 10/6/2020  Endin2020    Progress report due (10 Rx/or 30 days whichever is less): visit #10 or     Recertification due (POC duration/ or 90 days whichever is less): visit #12 or 2021    Visit # Insurance Allowable Auth required? Date Range   3/12 3/30 []  Yes  [x]  No 10/6 to 2021       Latex Allergy:  [x]No      []Yes  Pacemaker:  [] No       [] Yes     Preferred Language for Healthcare:   [x]English       []other:    Pain level: 1     SUBJECTIVE:   Patient reports some soreness after last treatment session but states it went away quickly, she states she did ice. Functional Disability Index: Quick DASH: raw score 42 72% disability    OBJECTIVE: See eval      RESTRICTIONS/PRECAUTIONS: hardware present in ulnar aspect of wrist    Exercises/Interventions:  Session initiated with paraffin bath for soft tissue benefits and pain relief. Pt received STM and brief scar massage for desensitization with pt education   Patient states she completed desensitization  Pt received gentle mobilization of wrist at carpals for slight distraction with carpal spreading, progressing into wrist flexion/extension with pt reporting wrist felt less tight with increased pain-free ROM. Pt then received gentle mobilization for radioulnar glide into short arc pronation/supination, followed by ulnar and radial deviation.   Patient kinesiotaped for mechanical correction distal radius / ulna,   Therapist fabricated wrist strap for increased external support of distal radius/ ulna. Patient educated to wear with kinesiotaping. Patient verbalized understanding and demonstrated independence donning and doffing strapping. Patient then completed exc below with verbal cues for eccentric control and to stabilize at shoulder along with incoroporation of ulnar aspect of hand. Educated in use of contrast bath to decrease pain , and to complete paper crumbles times 5 reps on days when she does not work,  In hand translation of beads for intrinsic hand function and dart thrower motion holding a marker and hitting a target. Therapeutic Exercises  Resistance / level Sets/sec Reps Notes   Short arc pronation/ supination 1 pound    10      short arc wrist extension/ flexion          Short arc radial deviation       Paper crumbles    3    In hand bead translation  2 10 beads    Dart thrower motion hitting target on dry erase board with emphasis of holding marker emphasizing ulnar aspect of hand to stabilize marker  1 10 reps Great difficulty stabilizing on ulnar aspect of hand                                      Neuromuscular Re-ed / Therapeutic Activities                                                 Manual Intervention                                                     Patient education:     · Patient instructed in use of kinesiotape for mechanical correction of wrist , patient verbalized understanding after demonstration provided. Patient also educated to complete short arc arom into supination of forearm and isometric strengthening into supination of forearm. Patient provided with demonstration and then she was able to complete independently. Home Exercise Program:   Patient instructed in use of kinesiotape for mechanical correction of wrist , patient verbalized understanding after demonstration provided.   Patient also educated to complete short arc arom into supination of forearm and isometric strengthening into supination of forearm. Patient provided with demonstration and then she was able to complete independently. Therapeutic Exercise and NMR:  [x] (54771) Provided verbal/tactile cueing for activities related to strengthening, flexibility, endurance, ROM  for improvements in scapular, scapulothoracic and UE control with self care, reaching, carrying, lifting, house/yardwork, driving/computer work.    [] (65386) Provided verbal/tactile cueing for activities related to improving balance, coordination, kinesthetic sense, posture, motor skill, proprioception  to assist with  scapular, scapulothoracic and UE control with self care, reaching, carrying, lifting, house/yardwork, driving/computer work.   [] Comments:    Therapeutic Activities:    [] (50480 or 03843) Provided verbal/tactile cueing for activities related to improving balance, coordination, kinesthetic sense, posture, motor skill, proprioception and motor activation to allow for proper function of scapular, scapulothoracic and UE control with self care, carrying, lifting, driving/computer work  [] Comments:    Home Exercise Program:    [x] (12233) Reviewed/Progressed HEP activities related to strengthening, flexibility, endurance, ROM of scapular, scapulothoracic and UE control with self care, reaching, carrying, lifting, house/yardwork, driving/computer work  [] (86594) Reviewed/Progressed HEP activities related to improving balance, coordination, kinesthetic sense, posture, motor skill, proprioception of scapular, scapulothoracic and UE control with self care, reaching, carrying, lifting, house/yardwork, driving/computer work    [] Comments:    Manual Treatments:  PROM / STM / Oscillations-Mobs:  G-I, II, III, IV (PA's, Inf., Post.)  [x] (02638) Provided manual therapy to mobilize soft tissue/joints of cervical/CT, scapular GHJ and UE for the purpose of modulating pain, promoting relaxation, increasing ROM, reducing/eliminating soft tissue swelling/inflammation/restriction, improving soft tissue extensibility and allowing for proper ROM for normal function with self care, reaching, carrying, lifting, house/yardwork, driving/computer work  [] Comments:    ADL Training:  [] (47345) Provided self-care/home management training related to activities of daily living and compensatory training, and/or use of adaptive equipment   [] Comments:     Splinting:  [] Fabrication of:   [] (16519) Orthotic/Prosthetic Management, subsequent encounter  [] (93381) Orthotic management and training (fitting and assessment)  [] Comments:    Modalities:   8 min paraffin     Charges:  Timed Code Treatment Minutes: 30   Total Treatment Minutes: 38     [] EVAL (LOW) 45444   [] OT Re-eval (18361)  [] EVAL (MOD) 05423   [] EVAL (HIGH) 39654       [x] Erika (07576) x   1  [] ULVUR(80831)  [] NMR (36890) x     [] Estim (attended) (40921)   [x] Manual ([id number]) x   1  [] US (74361)  [] TA (83783) x     [x] Paraffin (27045)  [] ADL  (88 [phone] [id number]) x    [] Splint/L code:    [] Estim (unattended) (22 [id number])  [] Fluidotherapy (70755)  [] Other:    GOALS: Patient stated goal: patient to be pain free at work  [x]? Progressing: []? Met: []? Not Met: []? Adjusted     Therapist goals for Patient:   Short Term Goals: To be achieved in: 30 days  1. Pt will be  Pain free during sign language  [x]? Progressing: []? Met: []? Not Met: []? Adjusted  2. Pt will be pain free with use of wrist mechanical correction tape at work for 3 hour shift  [x]? Progressing: []? Met: []? Not Met: []? Adjusted     Long Term Goals: To be achieved by dishcharge  1. Pt will will report a QuickDASH Symptom Severity Scale score of 20% or less indicating increased safety and functional independence in desired occupational pursuits by discharge. [x]? Progressing: []? Met: []? Not Met: []?  Adjusted    Progression Towards Functional goals:  [x] Patient is progressing as expected towards functional goals listed. [] Progression is slowed due to complexities listed. [] Progression has been slowed due to co-morbidities. [] Plan just implemented, too soon to assess goals progression  [] All goals are met  [] Other:     ASSESSMENT:  See eval    Treatment/Activity Tolerance:  [x] Patient tolerated treatment well [] Patient limited by fatique  [] Patient limited by pain  [] Patient limited by other medical complications  [] Other:     Prognosis: [x] Good [] Fair  [] Poor    Patient Requires Follow-up: [x] Yes  [] No    PLAN: See eval  [x] Continue per plan of care [] Alter current plan (see comments)  [] Plan of care initiated [] Hold pending MD visit [] Discharge    Electronically signed by:           Note: If patient does not return for scheduled/ recommended follow up visits, this note will serve as a discharge from care along with most recent update on progress.

## 2020-10-14 ENCOUNTER — HOSPITAL ENCOUNTER (OUTPATIENT)
Dept: OCCUPATIONAL THERAPY | Age: 25
Setting detail: THERAPIES SERIES
Discharge: HOME | End: 2020-10-14
Payer: COMMERCIAL

## 2020-10-14 PROCEDURE — 97110 THERAPEUTIC EXERCISES: CPT

## 2020-10-14 PROCEDURE — 97140 MANUAL THERAPY 1/> REGIONS: CPT

## 2020-10-14 PROCEDURE — 97018 PARAFFIN BATH THERAPY: CPT

## 2020-10-14 NOTE — FLOWSHEET NOTE
[facility] - Outpatient Occupational Therapy      Hand Therapy Daily Treatment Note  Date:  10/14/2020    Patient: Adryan Shah   : 1995   MRN: [id number]  Referring Physician:         Medical Diagnosis Information: wrist pain   Old wrist injury requiring pinning on proximal ulna, osteochrondromatosis                                                 Insurance information:  [facility]  Date of Injury: 10 years ago  Date of Surgery: 10 years ago    Progress Report: []  Yes  [x]  No     Date Range for reporting period:  Beginning: 10/6/2020  Endin2020    Progress report due (10 Rx/or 30 days whichever is less): visit #10 or     Recertification due (POC duration/ or 90 days whichever is less): visit #12 or 2021    Visit # Insurance Allowable Auth required? Date Range    []  Yes  [x]  No 10/6 to 2021       Latex Allergy:  [x]No      []Yes  Pacemaker:  [] No       [] Yes     Preferred Language for Healthcare:   [x]English       []other:    Pain level: 1/10     SUBJECTIVE:   Patient reports the new wrist strap works well with adding support and decreasing pain, especially when driving. Functional Disability Index: Quick DASH: raw score 42, 72% disability    OBJECTIVE: See eval    10/14/20: hand  strength (R- 30 lbs, L- 20 lbs)    RESTRICTIONS/PRECAUTIONS: hardware present in ulnar aspect of wrist    Exercises/Interventions:  Session initiated with paraffin bath for soft tissue benefits and pain relief. Pt received STM and brief scar massage for desensitization and soft tissue benefits. Pt received gentle mobilization of wrist at carpals for slight distraction with carpal spreading, progressing into wrist flexion/extension with pt reporting wrist felt less tight with increased pain-free ROM.  Pt then received gentle mobilization for radioulnar glide into short arc pronation/supination with non-painful \"click\" noted at mid-range that ceased following 3 rotations with support. Pt completed therapist-resisted wrist flexion/extension with hyperextension of MCPs noted during without support. Pt completed resisted finger flexion/extension with decreased strength noted in 4th and 5th digits.  strength measured and noted above. Pt completed wash cloth scrunches and isolated 5th digit ab/adduction with extension for strengthening and coordination with education on home completion and pt verbalizing understanding. Session concluded with pt participation in clothespin tree activity for both in-hand manipulation and tip pinch with pronation/supination for retrieval and placement; verbal cues for shoulder depression and in-hand manipulation with pronated forearm to increase challenge during palm<>finger translation with pt dropping 0 clothespins. Therapeutic Exercises  Resistance / level Sets/sec Reps Notes                                      Neuromuscular Re-ed / Therapeutic Activities                                                 Manual Intervention                                                     Patient education:     · Patient instructed in use of kinesiotape for mechanical correction of wrist , patient verbalized understanding after demonstration provided. Patient also educated to complete short arc arom into supination of forearm and isometric strengthening into supination of forearm. Patient provided with demonstration and then she was able to complete independently. Home Exercise Program:   Patient instructed in use of kinesiotape for mechanical correction of wrist , patient verbalized understanding after demonstration provided. Patient also educated to complete short arc arom into supination of forearm and isometric strengthening into supination of forearm. Patient provided with demonstration and then she was able to complete independently.    10/14: wash cloth scrunches and isolated 5th digit ab/adduction with extension for strengthening and coordination      Therapeutic Exercise and NMR:  [x] (34385) Provided verbal/tactile cueing for activities related to strengthening, flexibility, endurance, ROM  for improvements in scapular, scapulothoracic and UE control with self care, reaching, carrying, lifting, house/yardwork, driving/computer work.    [] (97790) Provided verbal/tactile cueing for activities related to improving balance, coordination, kinesthetic sense, posture, motor skill, proprioception  to assist with  scapular, scapulothoracic and UE control with self care, reaching, carrying, lifting, house/yardwork, driving/computer work.   [] Comments:    Therapeutic Activities:    [] (65424 or 31047) Provided verbal/tactile cueing for activities related to improving balance, coordination, kinesthetic sense, posture, motor skill, proprioception and motor activation to allow for proper function of scapular, scapulothoracic and UE control with self care, carrying, lifting, driving/computer work  [] Comments:    Home Exercise Program:    [x] (16692) Reviewed/Progressed HEP activities related to strengthening, flexibility, endurance, ROM of scapular, scapulothoracic and UE control with self care, reaching, carrying, lifting, house/yardwork, driving/computer work  [] (57127) Reviewed/Progressed HEP activities related to improving balance, coordination, kinesthetic sense, posture, motor skill, proprioception of scapular, scapulothoracic and UE control with self care, reaching, carrying, lifting, house/yardwork, driving/computer work    [] Comments:    Manual Treatments:  PROM / STM / Oscillations-Mobs:  G-I, II, III, IV (PA's, Inf., Post.)  [x] (53813) Provided manual therapy to mobilize soft tissue/joints of cervical/CT, scapular GHJ and UE for the purpose of modulating pain, promoting relaxation,  increasing ROM, reducing/eliminating soft tissue swelling/inflammation/restriction, improving soft tissue extensibility and allowing for proper ROM for normal function with self care, reaching, carrying, lifting, house/yardwork, driving/computer work  [] Comments:    ADL Training:  [] (58167) Provided self-care/home management training related to activities of daily living and compensatory training, and/or use of adaptive equipment   [] Comments:     Splinting:  [] Fabrication of:   [] (42104) Orthotic/Prosthetic Management, subsequent encounter  [] (36230) Orthotic management and training (fitting and assessment)  [] Comments:    Modalities:   8 min paraffin     Charges:  Timed Code Treatment Minutes: 37   Total Treatment Minutes: 45     [] EVAL (LOW) 01872   [] OT Re-eval (96099)  [] EVAL (MOD) 68730   [] EVAL (HIGH) 65524       [x] Erika (21755) x   1  [] TTSJL(07821)  [] NMR (00768) x     [] Estim (attended) (15927)   [x] Manual ([id number]) x   1  [] US (78124)  [] TA (31480) x     [x] Paraffin (79235)  [] ADL  (88 649 24 60) x    [] Splint/L code:    [] Estim (unattended) (22 115018)  [] Fluidotherapy (02464)  [] Other:    GOALS: Patient stated goal: patient to be pain free at work  [x]? Progressing: []? Met: []? Not Met: []? Adjusted     Therapist goals for Patient:   Short Term Goals: To be achieved in: 30 days  1. Pt will be  Pain free during sign language  [x]? Progressing: []? Met: []? Not Met: []? Adjusted  2. Pt will be pain free with use of wrist mechanical correction tape at work for 3 hour shift  [x]? Progressing: []? Met: []? Not Met: []? Adjusted     Long Term Goals: To be achieved by ana  1. Pt will will report a QuickDASH Symptom Severity Scale score of 20% or less indicating increased safety and functional independence in desired occupational pursuits by discharge. [x]? Progressing: []? Met: []? Not Met: []? Adjusted    Progression Towards Functional goals:  [x] Patient is progressing as expected towards functional goals listed. [] Progression is slowed due to complexities listed. [] Progression has been slowed due to co-morbidities.   [] Plan

## 2020-10-20 ENCOUNTER — APPOINTMENT (OUTPATIENT)
Dept: OCCUPATIONAL THERAPY | Age: 25
End: 2020-10-20
Payer: COMMERCIAL

## 2020-10-22 ENCOUNTER — HOSPITAL ENCOUNTER (OUTPATIENT)
Dept: OCCUPATIONAL THERAPY | Age: 25
Setting detail: THERAPIES SERIES
Discharge: HOME | End: 2020-10-22
Payer: COMMERCIAL

## 2020-10-22 PROCEDURE — 97140 MANUAL THERAPY 1/> REGIONS: CPT

## 2020-10-22 PROCEDURE — 97110 THERAPEUTIC EXERCISES: CPT

## 2020-10-22 PROCEDURE — 97018 PARAFFIN BATH THERAPY: CPT

## 2020-10-22 NOTE — FLOWSHEET NOTE
[facility] - Outpatient Occupational Therapy      Hand Therapy Daily Treatment Note  Date:  10/22/2020    Patient: Shola Wills   : 1995   MRN: [id number]  Referring Physician:         Medical Diagnosis Information: wrist pain   Old wrist injury requiring pinning on proximal ulna, osteochrondromatosis                                                 [facility]  Date of Injury: 10 years ago  Date of Surgery: 10 years ago    Progress Report: []  Yes  [x]  No     Date Range for reporting period:  Beginning: 10/6/2020  Endin2020    Progress report due (10 Rx/or 30 days whichever is less): visit #10 or     Recertification due (POC duration/ or 90 days whichever is less): visit #12 or 2021    Visit # Insurance Allowable Auth required? Date Range    []  Yes  [x]  No 10/6 to 2021       Latex Allergy:  [x]No      []Yes  Pacemaker:  [x] No       [] Yes     Preferred Language for Healthcare:   [x]English       []other:    Pain level: 1/10     SUBJECTIVE:   Pt reports increased aching and fatigue due to lifting heavy boxes at work the past few days. Functional Disability Index: Quick DASH: raw score 42, 72% disability    OBJECTIVE: See eval    10/14/20: hand  strength (R- 30 lbs, L- 20 lbs)    RESTRICTIONS/PRECAUTIONS: hardware present in ulnar aspect of wrist    Exercises/Interventions:  Session initiated with paraffin bath for soft tissue benefits and pain relief. Pt received STM and brief scar massage for desensitization and soft tissue benefits. Pt reports scar has been less sensitive recently. Pt received gentle mobilization of wrist at carpals for slight distraction with carpal spreading, progressing into wrist flexion/extension with increased ROM noted on this date. Pt then received gentle mobilization for radioulnar glide into short arc pronation/supination with manual support for stability.  Pt completed TE below for strengthening and stability. Pt used paraffin wax to complete thumb to each digit opposition x5 each with in-hand manipulation, followed by finger extension/abduction against wax loop for resistance with pt reporting slight ache in lateral 5th digit with full abduction. Session concluded with kinesio taping for mechanical correction and wrist support. Therapeutic Exercises  Resistance / level Sets/sec Reps Notes   Short arc pronation/ supinationSmall [id number] Forearm supported on wedge  short arc wrist extension/ flexion 1# 2   10 Forearm supported on wedge Short arc radial deviation Small mallet 1 10 Forearm supported on wedge                                    Neuromuscular Re-ed / Therapeutic Activities                                                 Manual Intervention       gentle mobilization of wrist at carpals for slight distraction with carpal spreading, progressing into wrist flexion/extension       gentle mobilization for radioulnar glide into short arc pronation/supination with manual support for stability                                       Patient education:     · Patient instructed in use of kinesiotape for mechanical correction of wrist , patient verbalized understanding after demonstration provided. Patient also educated to complete short arc arom into supination of forearm and isometric strengthening into supination of forearm. Patient provided with demonstration and then she was able to complete independently. Home Exercise Program:   Patient instructed in use of kinesiotape for mechanical correction of wrist , patient verbalized understanding after demonstration provided. Patient also educated to complete short arc arom into supination of forearm and isometric strengthening into supination of forearm. Patient provided with demonstration and then she was able to complete independently.    10/14: wash cloth scrunches and isolated 5th digit ab/adduction with extension for strengthening and coordination      Therapeutic Exercise and NMR:  [x] (27919) Provided verbal/tactile cueing for activities related to strengthening, flexibility, endurance, ROM  for improvements in scapular, scapulothoracic and UE control with self care, reaching, carrying, lifting, house/yardwork, driving/computer work.    [] (77940) Provided verbal/tactile cueing for activities related to improving balance, coordination, kinesthetic sense, posture, motor skill, proprioception  to assist with  scapular, scapulothoracic and UE control with self care, reaching, carrying, lifting, house/yardwork, driving/computer work.   [] Comments:    Therapeutic Activities:    [] (74192 or 43142) Provided verbal/tactile cueing for activities related to improving balance, coordination, kinesthetic sense, posture, motor skill, proprioception and motor activation to allow for proper function of scapular, scapulothoracic and UE control with self care, carrying, lifting, driving/computer work  [] Comments:    Home Exercise Program:    [x] (45817) Reviewed/Progressed HEP activities related to strengthening, flexibility, endurance, ROM of scapular, scapulothoracic and UE control with self care, reaching, carrying, lifting, house/yardwork, driving/computer work  [] (75925) Reviewed/Progressed HEP activities related to improving balance, coordination, kinesthetic sense, posture, motor skill, proprioception of scapular, scapulothoracic and UE control with self care, reaching, carrying, lifting, house/yardwork, driving/computer work    [] Comments:    Manual Treatments:  PROM / STM / Oscillations-Mobs:  G-I, II, III, IV (PA's, Inf., Post.)  [x] (05803) Provided manual therapy to mobilize soft tissue/joints of cervical/CT, scapular GHJ and UE for the purpose of modulating pain, promoting relaxation,  increasing ROM, reducing/eliminating soft tissue swelling/inflammation/restriction, improving soft tissue extensibility and allowing for proper ROM for normal function with self care, reaching, carrying, lifting, house/yardwork, driving/computer work  [] Comments:    ADL Training:  [] (34922) Provided self-care/home management training related to activities of daily living and compensatory training, and/or use of adaptive equipment   [] Comments:     Splinting:  [] Fabrication of:   [] (13453) Orthotic/Prosthetic Management, subsequent encounter  [] (30820) Orthotic management and training (fitting and assessment)  [] Comments:    Modalities:   8 min paraffin     Charges:  Timed Code Treatment Minutes: 37   Total Treatment Minutes: 45     [] EVAL (LOW) 23858   [] OT Re-eval (25710)  [] EVAL (MOD) 20103   [] EVAL (HIGH) 26664       [x] Erika (17290) x   1  [] DJNEC(61831)  [] NMR (80032) x     [] Estim (attended) (33164)   [x] Manual ([id number]) x   1  [] US (05073)  [] TA (59708) x     [x] Paraffin (56911)  [] ADL  (88 [phone] 60) x    [] Splint/L code:    [] Estim (unattended) (22 [id number])  [] Fluidotherapy (66610)  [] Other:    GOALS: Patient stated goal: patient to be pain free at work  [x]? Progressing: []? Met: []? Not Met: []? Adjusted     Therapist goals for Patient:   Short Term Goals: To be achieved in: 30 days  1. Pt will be  Pain free during sign language  [x]? Progressing: []? Met: []? Not Met: []? Adjusted  2. Pt will be pain free with use of wrist mechanical correction tape at work for 3 hour shift  [x]? Progressing: []? Met: []? Not Met: []? Adjusted     Long Term Goals: To be achieved by dishchaalma rosa  1. Pt will will report a QuickDASH Symptom Severity Scale score of 20% or less indicating increased safety and functional independence in desired occupational pursuits by discharge. [x]? Progressing: []? Met: []? Not Met: []? Adjusted    Progression Towards Functional goals:  [x] Patient is progressing as expected towards functional goals listed. [] Progression is slowed due to complexities listed. [] Progression has been slowed due to co-morbidities.   [] Plan

## 2020-10-27 ENCOUNTER — HOSPITAL ENCOUNTER (OUTPATIENT)
Dept: OCCUPATIONAL THERAPY | Age: 25
Setting detail: THERAPIES SERIES
Discharge: HOME | End: 2020-10-27
Payer: COMMERCIAL

## 2020-10-27 PROCEDURE — 97112 NEUROMUSCULAR REEDUCATION: CPT

## 2020-10-27 PROCEDURE — 97032 APPL MODALITY 1+ESTIM EA 15: CPT

## 2020-10-27 PROCEDURE — 97110 THERAPEUTIC EXERCISES: CPT

## 2020-10-27 NOTE — FLOWSHEET NOTE
function with self care, reaching, carrying, lifting, house/yardwork, driving/computer work  [] Comments:    ADL Training:  [] (85824) Provided self-care/home management training related to activities of daily living and compensatory training, and/or use of adaptive equipment   [] Comments:     Splinting:  [] Fabrication of:   [] (87729) Orthotic/Prosthetic Management, subsequent encounter  [] (45225) Orthotic management and training (fitting and assessment)  [] Comments:    Modalities:  y,      Charges:  Timed Code Treatment Minutes: 45   Total Treatment Minutes: 45     [] EVAL (LOW) 73509   [] OT Re-eval (96806)  [] EVAL (MOD) 96218   [] EVAL (HIGH) 56618       [x] Erika (86386) x   1  [] CJPHZ(71650)  [] NMR (60874) x     [] Estim (attended) (26393)   [x] Manual ([id number]) x   1  [] US (15790)  [] TA (41208) x     [] Paraffin (25860)  [] ADL  (88 649 24 60) x    [] Splint/L code:    [] Estim (unattended) (22 904815)  [] Fluidotherapy (70581)  [x] Other:splint  GOALS: Patient stated goal: patient to be pain free at work  [x]? Progressing: []? Met: []? Not Met: []? Adjusted     Therapist goals for Patient:   Short Term Goals: To be achieved in: 30 days  1. Pt will be  Pain free during sign language  [x]? Progressing: []? Met: []? Not Met: []? Adjusted  2. Pt will be pain free with use of wrist mechanical correction tape at work for 3 hour shift  [x]? Progressing: []? Met: []? Not Met: []? Adjusted     Long Term Goals: To be achieved by ana  1. Pt will will report a QuickDASH Symptom Severity Scale score of 20% or less indicating increased safety and functional independence in desired occupational pursuits by discharge. [x]? Progressing: []? Met: []? Not Met: []? Adjusted    Progression Towards Functional goals:  [x] Patient is progressing as expected towards functional goals listed. [] Progression is slowed due to complexities listed. [] Progression has been slowed due to co-morbidities.   [] Plan just implemented, too soon to assess goals progression  [] All goals are met  [] Other:     ASSESSMENT:  See eval    Treatment/Activity Tolerance:  [x] Patient tolerated treatment well [] Patient limited by fatique  [] Patient limited by pain  [] Patient limited by other medical complications  [] Other:     Prognosis: [x] Good [] Fair  [] Poor    Patient Requires Follow-up: [x] Yes  [] No    PLAN: See eval  [x] Continue per plan of care [] Alter current plan (see comments)  [] Plan of care initiated [] Hold pending MD visit [] Discharge    Electronically signed by:              Note: If patient does not return for scheduled/ recommended follow up visits, this note will serve as a discharge from care along with most recent update on progress.

## 2020-10-29 ENCOUNTER — HOSPITAL ENCOUNTER (OUTPATIENT)
Dept: OCCUPATIONAL THERAPY | Age: 25
Setting detail: THERAPIES SERIES
Discharge: HOME | End: 2020-10-29
Payer: COMMERCIAL

## 2020-10-29 PROCEDURE — 97140 MANUAL THERAPY 1/> REGIONS: CPT

## 2020-10-29 PROCEDURE — 97110 THERAPEUTIC EXERCISES: CPT

## 2020-10-29 PROCEDURE — 97760 ORTHOTIC MGMT&TRAING 1ST ENC: CPT

## 2020-10-29 NOTE — FLOWSHEET NOTE
[facility] - Outpatient Occupational Therapy      Hand Therapy Daily Treatment Note  Date:  10/29/2020    Patient: Myles Grant   : 1995   MRN: [id number]  Referring Physician:         Medical Diagnosis Information: wrist pain   Old wrist injury requiring pinning on proximal ulna, osteochrondromatosis                                                 Insurance information:  [facility]  Date of Injury: 10 years ago  Date of Surgery: 10 years ago    Progress Report: []  Yes  [x]  No     Date Range for reporting period:  Beginning: 10/6/2020  Endin2020    Progress report due (10 Rx/or 30 days whichever is less): visit #10 or 682    Recertification due (POC duration/ or 90 days whichever is less): visit #12 or 2021    Visit # Insurance Allowable Auth required? Date Range    []  Yes  [x]  No 10/6 to 2021       Latex Allergy:  [x]No      []Yes  Pacemaker:  [x] No       [] Yes     Preferred Language for Healthcare:   [x]English       []other:    Pain level: mild stiffness no pain     SUBJECTIVE:   Pt reports splint was working well, but a piece on it broke and the 5th digit section is too loose now; pt reports her fingers may have been swollen at last visit. Functional Disability Index: Quick DASH: raw score 42, 72% disability    OBJECTIVE: See eval    10/14/20: hand  strength (R- 30 lbs, L- 20 lbs)    RESTRICTIONS/PRECAUTIONS: hardware present in ulnar aspect of wrist    Exercises/Interventions:  Session initiated with re-fabrication of relative motion splint to make adjustments noted above with pt reporting improved fit following.  Pt received 6 minutes paraffin for soft tissue benefits and pain relief due to reports of stiffness, followed by gentle mobilization of wrist at carpals for wrist flexion/extension, followed by radioulnar glide into short arc pronation/supination with manual support for stability, improved ROM noted, and improved radioulnar glide noted. Pt was then educated on and completed tendon gliding exercises due to triggering noted at 5th PIP with manual support required for 5th digit adduction during due to intrinsic weakness; pt educated to complete at home with splint on, in addition to continuing finger adduction exercises with pt verbalizing understanding. Next, pt used paraffin wax as resistance to complete lumbrical strengthening pressing into paraffin wax with focus on positioning wrist and forearm on table to isolate intrinsics and prevent MCP hyperextension with pt educated on maintaining form during home completion and pt verbalizing understanding. Therapeutic Exercises  Resistance / level Sets/sec Reps Notes                                      Neuromuscular Re-ed / Therapeutic Activities                                                 Manual Intervention       gentle mobilization of wrist at carpals for slight distraction with carpal spreading, progressing into wrist flexion/extension       gentle mobilization for radioulnar glide into short arc pronation/supination with manual support for stability                                       Patient education:     · Patient instructed in use of kinesiotape for mechanical correction of wrist , patient verbalized understanding after demonstration provided. Patient also educated to complete short arc arom into supination of forearm and isometric strengthening into supination of forearm. Patient provided with demonstration and then she was able to complete independently. Home Exercise Program:   Patient instructed in use of kinesiotape for mechanical correction of wrist , patient verbalized understanding after demonstration provided. Patient also educated to complete short arc arom into supination of forearm and isometric strengthening into supination of forearm. Patient provided with demonstration and then she was able to complete independently.    10/14: wash cloth scrunches and isolated 5th digit ab/adduction with extension for strengthening and coordination      Therapeutic Exercise and NMR:  [x] (76710) Provided verbal/tactile cueing for activities related to strengthening, flexibility, endurance, ROM  for improvements in scapular, scapulothoracic and UE control with self care, reaching, carrying, lifting, house/yardwork, driving/computer work.    [] (07501) Provided verbal/tactile cueing for activities related to improving balance, coordination, kinesthetic sense, posture, motor skill, proprioception  to assist with  scapular, scapulothoracic and UE control with self care, reaching, carrying, lifting, house/yardwork, driving/computer work.   [] Comments:    Therapeutic Activities:    [] (55441 or 74232) Provided verbal/tactile cueing for activities related to improving balance, coordination, kinesthetic sense, posture, motor skill, proprioception and motor activation to allow for proper function of scapular, scapulothoracic and UE control with self care, carrying, lifting, driving/computer work  [] Comments:    Home Exercise Program:    [x] (79429) Reviewed/Progressed HEP activities related to strengthening, flexibility, endurance, ROM of scapular, scapulothoracic and UE control with self care, reaching, carrying, lifting, house/yardwork, driving/computer work  [] (05135) Reviewed/Progressed HEP activities related to improving balance, coordination, kinesthetic sense, posture, motor skill, proprioception of scapular, scapulothoracic and UE control with self care, reaching, carrying, lifting, house/yardwork, driving/computer work    [] Comments:    Manual Treatments:  PROM / STM / Oscillations-Mobs:  G-I, II, III, IV (PA's, Inf., Post.)  [x] (01496) Provided manual therapy to mobilize soft tissue/joints of cervical/CT, scapular GHJ and UE for the purpose of modulating pain, promoting relaxation,  increasing ROM, reducing/eliminating soft tissue to complexities listed. [] Progression has been slowed due to co-morbidities. [] Plan just implemented, too soon to assess goals progression  [] All goals are met  [] Other:     ASSESSMENT:  See eval    Treatment/Activity Tolerance:  [x] Patient tolerated treatment well [] Patient limited by fatique  [] Patient limited by pain  [] Patient limited by other medical complications  [] Other:     Prognosis: [x] Good [] Fair  [] Poor    Patient Requires Follow-up: [x] Yes  [] No    PLAN: See eval  [x] Continue per plan of care [] Alter current plan (see comments)  [] Plan of care initiated [] Hold pending MD visit [] Discharge    Electronically signed by:       Kirsten Tong, OTR/L [id number]      Note: If patient does not return for scheduled/ recommended follow up visits, this note will serve as a discharge from care along with most recent update on progress.

## 2020-11-03 ENCOUNTER — HOSPITAL ENCOUNTER (OUTPATIENT)
Dept: OCCUPATIONAL THERAPY | Age: 25
Setting detail: THERAPIES SERIES
Discharge: HOME | End: 2020-11-03
Payer: COMMERCIAL

## 2020-11-03 NOTE — PROGRESS NOTES
Occupational Therapy      Occupational Therapy  Cancellation/No-show Note  Patient Name:  Dedrick Valdes   :  1995   Date:  11/3/2020  Cancelled visits to date: 1  No-shows to date: 0    Patient status for today's appointment patient:  [x]  Cancelled  []  Rescheduled appointment  []  No-show     Reason given by patient:  [x]  Patient ill  []  Conflicting appointment  []  No transportation    []  Conflict with work  []  No reason given  []  Other:     Comments:      Phone call information:   []  Phone call made today to patient at _ time at number provided:      []  Patient answered, conversation as follows:    []  Patient did not answer, message left as follows:  []  Phone call not made today    Electronically signed by:  Cristiano Tobar, OT

## 2020-11-05 ENCOUNTER — HOSPITAL ENCOUNTER (OUTPATIENT)
Dept: OCCUPATIONAL THERAPY | Age: 25
Setting detail: THERAPIES SERIES
Discharge: HOME | End: 2020-11-05
Payer: COMMERCIAL

## 2020-11-05 PROCEDURE — 97018 PARAFFIN BATH THERAPY: CPT

## 2020-11-05 PROCEDURE — 97140 MANUAL THERAPY 1/> REGIONS: CPT

## 2020-11-05 PROCEDURE — 97110 THERAPEUTIC EXERCISES: CPT

## 2020-11-05 NOTE — PROGRESS NOTES
[facility] - Outpatient Occupational Therapy      Hand Therapy Daily Treatment/Progress Note  Date:  2020    Patient: Dedrick Valdes   : 1995   MRN: [id number]  Referring Physician:         Medical Diagnosis Information: wrist pain   Old wrist injury requiring pinning on proximal ulna, osteochrondromatosis                                                 Insurance information:  [facility]  Date of Injury: 10 years ago  Date of Surgery: 10 years ago    Progress Report: []  Yes  [x]  No     Date Range for reporting period:  Beginning: 10/6/2020  Endin2020    Progress report due (10 Rx/or 30 days whichever is less): visit #20 or 20 (completed )    Recertification due (POC duration/ or 90 days whichever is less): visit #12 or 2021    Visit # Insurance Allowable Auth required? Date Range    []  Yes  [x]  No 10/6 to 2021       Latex Allergy:  [x]No      []Yes  Pacemaker:  [x] No       [] Yes     Preferred Language for Healthcare:   [x]English       []other:    Pain level: 3/10 aching from work     SUBJECTIVE:   Pt reports slight aching in wrist from work with improved stiffness. Functional Disability Index: Quick DASH: raw score 42, 72% disability    OBJECTIVE: See eval    10/6/20: Edema present on left wrist 19 cm vs 18 cm on right, noted pinky in slight flexion at rest at pipj, can extend pip with difficulty    : L wrist circumference- 17.8 cm, R- 17.5 cm     AROM     L R   Wrist Extension  10/6: 60  : 75 10/6: 75       10/14/20: hand  strength (R- 30 lbs, L- 20 lbs)    20: hand  strength (R- 27 lbs, L- 25 lbs)    RESTRICTIONS/PRECAUTIONS: hardware present in ulnar aspect of wrist    Exercises/Interventions:  Session initiated with assessment of pt progress with subjective and objective measures noted above.  Pt received 8 minutes paraffin for soft tissue benefits and pain relief due to reports of aching; improved wrist flexion/extension and pronation/supination noted WFL without need for mobilization. When asked, pt reports she has not been kinesio taping as much, but is using her wrist support strap at work with decreased pain and fatigue. Pt kinesio taped for wrist support and mechanical correction. Pt completed TE below with fatigue and no pain reported, and education to complete TE with towel as part of HEP with pt verbalizing understanding. Therapeutic Exercises  Resistance / level Sets/sec Reps Notes   Short arc pronation/supination Red flexbar 2 8    Towel slides in stance with weight bearing through table  1 5    Towel scrunch, grasp, wringing, repeat  1 5                  Neuromuscular Re-ed / Therapeutic Activities                                                 Manual Intervention                                                   Patient education:     · Patient instructed in use of kinesiotape for mechanical correction of wrist , patient verbalized understanding after demonstration provided. Patient also educated to complete short arc arom into supination of forearm and isometric strengthening into supination of forearm. Patient provided with demonstration and then she was able to complete independently. Home Exercise Program:   Patient instructed in use of kinesiotape for mechanical correction of wrist , patient verbalized understanding after demonstration provided. Patient also educated to complete short arc arom into supination of forearm and isometric strengthening into supination of forearm. Patient provided with demonstration and then she was able to complete independently. 10/14: wash cloth scrunches and isolated 5th digit ab/adduction with extension for strengthening and coordination    11/5: Towel slides in stance with weight bearing through table;  Towel scrunch, grasp, wringing, repeat     Therapeutic Exercise and NMR:  [x] (37530) Provided verbal/tactile cueing for activities related to strengthening, flexibility, endurance, ROM  for improvements in scapular, scapulothoracic and UE control with self care, reaching, carrying, lifting, house/yardwork, driving/computer work.    [] (98275) Provided verbal/tactile cueing for activities related to improving balance, coordination, kinesthetic sense, posture, motor skill, proprioception  to assist with  scapular, scapulothoracic and UE control with self care, reaching, carrying, lifting, house/yardwork, driving/computer work.   [] Comments:    Therapeutic Activities:    [] (85050 or 58690) Provided verbal/tactile cueing for activities related to improving balance, coordination, kinesthetic sense, posture, motor skill, proprioception and motor activation to allow for proper function of scapular, scapulothoracic and UE control with self care, carrying, lifting, driving/computer work  [] Comments:    Home Exercise Program:    [x] (37337) Reviewed/Progressed HEP activities related to strengthening, flexibility, endurance, ROM of scapular, scapulothoracic and UE control with self care, reaching, carrying, lifting, house/yardwork, driving/computer work  [] (14925) Reviewed/Progressed HEP activities related to improving balance, coordination, kinesthetic sense, posture, motor skill, proprioception of scapular, scapulothoracic and UE control with self care, reaching, carrying, lifting, house/yardwork, driving/computer work    [] Comments:    Manual Treatments:  PROM / STM / Oscillations-Mobs:  G-I, II, III, IV (PA's, Inf., Post.)  [x] (52619) Provided manual therapy to mobilize soft tissue/joints of cervical/CT, scapular GHJ and UE for the purpose of modulating pain, promoting relaxation,  increasing ROM, reducing/eliminating soft tissue swelling/inflammation/restriction, improving soft tissue extensibility and allowing for proper ROM for normal function with self care, reaching, carrying, lifting, house/yardwork, driving/computer work  [] Comments:    ADL

## 2020-11-10 ENCOUNTER — HOSPITAL ENCOUNTER (OUTPATIENT)
Dept: OCCUPATIONAL THERAPY | Age: 25
Setting detail: THERAPIES SERIES
Discharge: HOME | End: 2020-11-10
Payer: COMMERCIAL

## 2020-11-10 PROCEDURE — 97110 THERAPEUTIC EXERCISES: CPT

## 2020-11-10 PROCEDURE — 97022 WHIRLPOOL THERAPY: CPT

## 2020-11-10 NOTE — PROGRESS NOTES
under wrist strap for improved support. Completed wrist maze for warm up followed by in hand translation removing magnetized bottle caps, from upright wall with verbal cues for posture and scapular stability, tends to keep forearm arm in pronation when picking up small objects. She then worked on picking up small beads and sorting into ice cube tray. Patient finished with short arc strengthening with 1 pound weight pronation/ supination, wrist flexion/ extension and radial / ulnar deviation times 10 reps each. Therapeutic Exercises  Resistance / level Sets/sec Reps Notes   Short arc pronation/supination Red flexbar 2 8    Towel slides in stance with weight bearing through table  1 5    Towel scrunch, grasp, wringing, repeat  1 5                  Neuromuscular Re-ed / Therapeutic Activities                                                 Manual Intervention                                                   Patient education:     · Patient instructed in use of kinesiotape for mechanical correction of wrist , patient verbalized understanding after demonstration provided. Patient also educated to complete short arc arom into supination of forearm and isometric strengthening into supination of forearm. Patient provided with demonstration and then she was able to complete independently. Home Exercise Program:   Patient instructed in use of kinesiotape for mechanical correction of wrist , patient verbalized understanding after demonstration provided. Patient also educated to complete short arc arom into supination of forearm and isometric strengthening into supination of forearm. Patient provided with demonstration and then she was able to complete independently. 10/14: wash cloth scrunches and isolated 5th digit ab/adduction with extension for strengthening and coordination    11/5: Towel slides in stance with weight bearing through table;  Towel scrunch, grasp, wringing, repeat     Therapeutic Exercise and NMR:  [x] (93617) Provided verbal/tactile cueing for activities related to strengthening, flexibility, endurance, ROM  for improvements in scapular, scapulothoracic and UE control with self care, reaching, carrying, lifting, house/yardwork, driving/computer work.    [] (23701) Provided verbal/tactile cueing for activities related to improving balance, coordination, kinesthetic sense, posture, motor skill, proprioception  to assist with  scapular, scapulothoracic and UE control with self care, reaching, carrying, lifting, house/yardwork, driving/computer work.   [] Comments:    Therapeutic Activities:    [] (76292 or 38421) Provided verbal/tactile cueing for activities related to improving balance, coordination, kinesthetic sense, posture, motor skill, proprioception and motor activation to allow for proper function of scapular, scapulothoracic and UE control with self care, carrying, lifting, driving/computer work  [] Comments:    Home Exercise Program:    [x] (46217) Reviewed/Progressed HEP activities related to strengthening, flexibility, endurance, ROM of scapular, scapulothoracic and UE control with self care, reaching, carrying, lifting, house/yardwork, driving/computer work  [] (83559) Reviewed/Progressed HEP activities related to improving balance, coordination, kinesthetic sense, posture, motor skill, proprioception of scapular, scapulothoracic and UE control with self care, reaching, carrying, lifting, house/yardwork, driving/computer work    [] Comments:    Manual Treatments:  PROM / STM / Oscillations-Mobs:  G-I, II, III, IV (PA's, Inf., Post.)  [x] (24436) Provided manual therapy to mobilize soft tissue/joints of cervical/CT, scapular GHJ and UE for the purpose of modulating pain, promoting relaxation,  increasing ROM, reducing/eliminating soft tissue swelling/inflammation/restriction, improving soft tissue extensibility and allowing for proper ROM for normal function with self care, reaching, progression  [] All goals are met  [] Other:     ASSESSMENT:  See eval    Treatment/Activity Tolerance:  [x] Patient tolerated treatment well [] Patient limited by fatique  [] Patient limited by pain  [] Patient limited by other medical complications  [] Other:     Prognosis: [x] Good [] Fair  [] Poor    Patient Requires Follow-up: [x] Yes  [] No    PLAN: See eval  [x] Continue per plan of care [] Alter current plan (see comments)  [] Plan of care initiated [] Hold pending MD visit [] Discharge    Electronically signed by:                 Note: If patient does not return for scheduled/ recommended follow up visits, this note will serve as a discharge from care along with most recent update on progress.

## 2020-11-12 ENCOUNTER — HOSPITAL ENCOUNTER (OUTPATIENT)
Dept: OCCUPATIONAL THERAPY | Age: 25
Setting detail: THERAPIES SERIES
Discharge: HOME | End: 2020-11-12
Payer: COMMERCIAL

## 2020-11-12 PROCEDURE — 97110 THERAPEUTIC EXERCISES: CPT

## 2020-11-12 PROCEDURE — 97018 PARAFFIN BATH THERAPY: CPT

## 2020-11-12 NOTE — FLOWSHEET NOTE
[facility] - Outpatient Occupational Therapy      Hand Therapy Daily Treatment Note   Date:  2020    Patient: Maricruz Steward   : 1995   MRN: [id number]  Referring Physician:         Medical Diagnosis Information: wrist pain   Old wrist injury requiring pinning on proximal ulna, osteochrondromatosis                                                 Insurance information:  [facility]  Date of Injury: 10 years ago  Date of Surgery: 10 years ago    Progress Report: []  Yes  [x]  No     Date Range for reporting period:  Beginning: 10/6/2020  Endin2020    Progress report due (10 Rx/or 30 days whichever is less): visit #20 or 20 (completed )    Recertification due (POC duration/ or 90 days whichever is less): visit #12 or 2021    Visit # Insurance Allowable Auth required? Date Range   10/12 10/30 []  Yes  [x]  No 10/6 to 2021       Latex Allergy:  [x]No      []Yes  Pacemaker:  [x] No       [] Yes     Preferred Language for Healthcare:   [x]English       []other:    Pain level: 3/10    SUBJECTIVE:    Pt reports mild continued soreness from lifting 40-50 pound boxes at work on Monday with some improvements following session on Tuesday. Discussion regarding upcoming d/c plans with pt reporting she feels confident to continue HEP to increase stability and strength while managing pain.      Functional Disability Index: Quick DASH: raw score 42, 72% disability    OBJECTIVE: See eval    10/6/20: Edema present on left wrist 19 cm vs 18 cm on right, noted pinky in slight flexion at rest at pipj, can extend pip with difficulty    : L wrist circumference- 17.8 cm, R- 17.5 cm     AROM     L R   Wrist Extension  10/6: 60  : 75 10/6: 75       10/14/20: hand  strength (R- 30 lbs, L- 20 lbs)    20: hand  strength (R- 27 lbs, L- 25 lbs)    RESTRICTIONS/PRECAUTIONS: hardware present in ulnar aspect of wrist    Exercises/Interventions:  Improved swelling independently. 10/14: wash cloth scrunches and isolated 5th digit ab/adduction with extension for strengthening and coordination    11/5: Towel slides in stance with weight bearing through table; Towel scrunch, grasp, wringing, repeat     Therapeutic Exercise and NMR:  [x] (25176) Provided verbal/tactile cueing for activities related to strengthening, flexibility, endurance, ROM  for improvements in scapular, scapulothoracic and UE control with self care, reaching, carrying, lifting, house/yardwork, driving/computer work.    [] (36063) Provided verbal/tactile cueing for activities related to improving balance, coordination, kinesthetic sense, posture, motor skill, proprioception  to assist with  scapular, scapulothoracic and UE control with self care, reaching, carrying, lifting, house/yardwork, driving/computer work.   [] Comments:    Therapeutic Activities:    [] (30248 or 64538) Provided verbal/tactile cueing for activities related to improving balance, coordination, kinesthetic sense, posture, motor skill, proprioception and motor activation to allow for proper function of scapular, scapulothoracic and UE control with self care, carrying, lifting, driving/computer work  [] Comments:    Home Exercise Program:    [] (92803) Reviewed/Progressed HEP activities related to strengthening, flexibility, endurance, ROM of scapular, scapulothoracic and UE control with self care, reaching, carrying, lifting, house/yardwork, driving/computer work  [] (98986) Reviewed/Progressed HEP activities related to improving balance, coordination, kinesthetic sense, posture, motor skill, proprioception of scapular, scapulothoracic and UE control with self care, reaching, carrying, lifting, house/yardwork, driving/computer work    [] Comments:    Manual Treatments:  PROM / STM / Oscillations-Mobs:  G-I, II, III, IV (PA's, Inf., Post.)  [] (68436) Provided manual therapy to mobilize soft tissue/joints of cervical/CT, scapular GHJ and UE for the purpose of modulating pain, promoting relaxation,  increasing ROM, reducing/eliminating soft tissue swelling/inflammation/restriction, improving soft tissue extensibility and allowing for proper ROM for normal function with self care, reaching, carrying, lifting, house/yardwork, driving/computer work  [] Comments:    ADL Training:  [] (37966) Provided self-care/home management training related to activities of daily living and compensatory training, and/or use of adaptive equipment   [] Comments:     Splinting:  [] Fabrication of:   [] (07037) Orthotic/Prosthetic Management, subsequent encounter  [] (94962) Orthotic management and training (fitting and assessment)  [] Comments:    Modalities:  y, 8 min paraffin     Charges:  Timed Code Treatment Minutes: 37   Total Treatment Minutes: 45     [] EVAL (LOW) 56762   [] OT Re-eval (16482)  [] EVAL (MOD) 41046   [] EVAL (HIGH) 75371       [x] Erika (46354) x   2  [] OEMLA(13674)  [] NMR (67815) x     [] Estim (attended) (10542)   [] Manual ([id number]) x     [] US (14615)  [] TA () x     [x] Paraffin (66009)  [] ADL  (88 [phone] 60) x    [] Splint/L code:  57146  [] Estim (unattended) ([id number])  [] Fluidotherapy (99358)  [] Other:     GOALS: Patient stated goal: patient to be pain free at work  [x]? Progressing: []? Met: []? Not Met: []? Adjusted     Therapist goals for Patient:   Short Term Goals: To be achieved in: 30 days  1. Pt will be  Pain free during sign language  [x]? Progressing: []? Met: []? Not Met: []? Adjusted  2. Pt will be pain free with use of wrist mechanical correction tape at work for 3 hour shift  [x]? Progressing: []? Met: []? Not Met: []? Adjusted     Long Term Goals: To be achieved by discharge  1. Pt will report a QuickDASH Symptom Severity Scale score of 20% or less indicating increased safety and functional independence in desired occupational pursuits by discharge. [x]? Progressing: []? Met: []? Not Met: []?  Adjusted    Progression Towards Functional goals:  [x] Patient is progressing as expected towards functional goals listed. [] Progression is slowed due to complexities listed. [] Progression has been slowed due to co-morbidities. [] Plan just implemented, too soon to assess goals progression  [] All goals are met  [] Other:     ASSESSMENT:  See eval    Treatment/Activity Tolerance:  [x] Patient tolerated treatment well [] Patient limited by fatique  [] Patient limited by pain  [] Patient limited by other medical complications  [] Other:     Prognosis: [x] Good [] Fair  [] Poor    Patient Requires Follow-up: [x] Yes  [] No    PLAN: See eval  [x] Continue per plan of care [] Alter current plan (see comments)  [] Plan of care initiated [] Hold pending MD visit [] Discharge    Electronically signed by:           Sonya Jauregui OTR/L [id number]      Note: If patient does not return for scheduled/ recommended follow up visits, this note will serve as a discharge from care along with most recent update on progress.

## 2020-11-19 ENCOUNTER — HOSPITAL ENCOUNTER (OUTPATIENT)
Dept: OCCUPATIONAL THERAPY | Age: 25
Setting detail: THERAPIES SERIES
Discharge: HOME | End: 2020-11-19
Payer: COMMERCIAL

## 2020-11-19 PROCEDURE — 97110 THERAPEUTIC EXERCISES: CPT

## 2020-11-19 PROCEDURE — 97018 PARAFFIN BATH THERAPY: CPT

## 2020-11-19 NOTE — DISCHARGE SUMMARY
[facility] - Outpatient Occupational Therapy      Hand Therapy Daily Treatment Note/Discharge Summary   Date:  2020    Patient: Shira Patterson   : 1995   MRN: [id number]  Referring Physician:         Medical Diagnosis Information: wrist pain   Old wrist injury requiring pinning on proximal ulna, osteochrondromatosis                                                 Insurance information:  [facility]  Date of Injury: 10 years ago  Date of Surgery: 10 years ago    Progress Report: []  Yes  [x]  No     Date Range for reporting period:  Beginning: 10/6/2020  Endin2020    Progress report due (10 Rx/or 30 days whichever is less): visit #20 or 20 (completed )    Recertification due (POC duration/ or 90 days whichever is less): visit #12 or 2021    Visit # Insurance Allowable Auth required? Date Range    []  Yes  [x]  No 10/6 to 2021       Latex Allergy:  [x]No      []Yes  Pacemaker:  [x] No       [] Yes     Preferred Language for Healthcare:   [x]English       []other:    Pain level: 0/10    SUBJECTIVE:    Pt reports she has noticed improvements in strength and ROM, and only experiences mild pain during difficult work tasks now. Pt reports she feels confident to continue HEP to increase stability and strength while managing pain, and d/c on this date.      Functional Disability Index: Quick DASH: raw score 42, 72% disability    20: Quick DASH: total score- 24, disability score- 29.55%    OBJECTIVE: See eval    10/6/20: Edema present on left wrist 19 cm vs 18 cm on right, noted pinky in slight flexion at rest at pipj, can extend pip with difficulty    : L wrist circumference- 17.8 cm, R- 17.5 cm     AROM     L R   Wrist Extension  10/6: 60  11: 75 10/6: 75       10/14/20: hand  strength (R- 30 lbs, L- 20 lbs)    20: hand  strength (R- 27 lbs, L- 25 lbs)    20: hand  strength (R- 40 lbs, L- 35 lbs)    RESTRICTIONS/PRECAUTIONS: hardware present in ulnar aspect of wrist    Exercises/Interventions: Session initiated with assessment of pt progress with subjective and objective measures noted above, and progress with goals noted below with all goals met at this time. 8 minutes paraffin bath completed for soft tissue benefits with decreased stiffness following; reviewed pt education on completion using home paraffin bath pt is planning to purchase with pt verbalizing understanding. Pt kinesio taped for wrist support and mechanical correction, as well as additional TFCC support along ulnar border with pt reporting increased stability; pt educated on self-application and provided with additional kinesio tape strips and resources to purchase more with pt verbalizing understanding. Reviewed pt's HEP with pt demonstrating correct form and verbalizing understanding of transition to independent HEP. Pt educated on notifying physician if symptoms return or worsen with pt verbalizing understanding. Pt discharged. Therapeutic Exercises  Resistance / level Sets/sec Reps Notes       Patient education:     · Patient instructed in use of kinesiotape for mechanical correction of wrist , patient verbalized understanding after demonstration provided. Patient also educated to complete short arc arom into supination of forearm and isometric strengthening into supination of forearm. Patient provided with demonstration and then she was able to complete independently. Home Exercise Program:   Patient instructed in use of kinesiotape for mechanical correction of wrist , patient verbalized understanding after demonstration provided. Patient also educated to complete short arc arom into supination of forearm and isometric strengthening into supination of forearm. Patient provided with demonstration and then she was able to complete independently.    10/14: wash cloth scrunches and isolated 5th digit ab/adduction with scapulothoracic and UE control with self care, carrying, lifting, driving/computer work  [] Comments:    Home Exercise Program:    [x] (43615) Reviewed/Progressed HEP activities related to strengthening, flexibility, endurance, ROM of scapular, scapulothoracic and UE control with self care, reaching, carrying, lifting, house/yardwork, driving/computer work  [] (66345) Reviewed/Progressed HEP activities related to improving balance, coordination, kinesthetic sense, posture, motor skill, proprioception of scapular, scapulothoracic and UE control with self care, reaching, carrying, lifting, house/yardwork, driving/computer work    [] Comments:    Manual Treatments:  PROM / STM / Oscillations-Mobs:  G-I, II, III, IV (PA's, Inf., Post.)  [] ([id number]) Provided manual therapy to mobilize soft tissue/joints of cervical/CT, scapular GHJ and UE for the purpose of modulating pain, promoting relaxation,  increasing ROM, reducing/eliminating soft tissue swelling/inflammation/restriction, improving soft tissue extensibility and allowing for proper ROM for normal function with self care, reaching, carrying, lifting, house/yardwork, driving/computer work  [] Comments:    ADL Training:  [] (65562) Provided self-care/home management training related to activities of daily living and compensatory training, and/or use of adaptive equipment   [] Comments:     Splinting:  [] Fabrication of:   [] (14659) Orthotic/Prosthetic Management, subsequent encounter  [] (95087) Orthotic management and training (fitting and assessment)  [] Comments:    Modalities:  y, 8 min paraffin     Charges:  Timed Code Treatment Minutes: 37   Total Treatment Minutes: 45     [] EVAL (LOW) 51576   [] OT Re-eval (61717)  [] EVAL (MOD) 36657   [] EVAL (HIGH) 55878       [x] Erika (02808) x   2  [] FWDPX(84078)  [] NMR (05457) x     [] Estim (attended) (04610)   [] Manual ([id number]) x     [] US (29260)  [] TA (70772) x     [x] Paraffin (83773)  [] ADL  (88 649 24 60) x    [] Splint/L code:  48534  [] Estim (unattended) 33 93 31)  [] Fluidotherapy (57009)  [] Other:     GOALS: Patient stated goal: patient to be pain free at work  []? Progressing: [x]? Met: []? Not Met: []? Adjusted   11/19: Pt reports she occasionally has mild pain, but it is no longer impacting work ability.      Therapist goals for Patient:   Short Term Goals: To be achieved in: 30 days  1. Pt will be  Pain free during sign language  []? Progressing: [x]? Met: []? Not Met: []? Adjusted  2. Pt will be pain free with use of wrist mechanical correction tape at work for 3 hour shift  []? Progressing: [x]? Met: []? Not Met: []? Adjusted     Long Term Goals: To be achieved by discharge  1. Pt will report a QuickDASH Symptom Severity Scale score of 30% or less indicating increased safety and functional independence in desired occupational pursuits by discharge. []? Progressing: [x]? Met: []? Not Met: [x]? Adjusted    Progression Towards Functional goals:  [] Patient is progressing as expected towards functional goals listed. [] Progression is slowed due to complexities listed. [] Progression has been slowed due to co-morbidities.   [] Plan just implemented, too soon to assess goals progression  [x] All goals are met  [] Other:     ASSESSMENT:  See eval    Treatment/Activity Tolerance:  [x] Patient tolerated treatment well [] Patient limited by fatigue  [] Patient limited by pain  [] Patient limited by other medical complications  [] Other:     Prognosis: [x] Good [] Fair  [] Poor    Patient Requires Follow-up: [] Yes  [x] No    PLAN: See eval  [] Continue per plan of care [] Alter current plan (see comments)  [] Plan of care initiated [] Hold pending MD visit [x] Discharge    Electronically signed by:           Tejal Shearer, OTR/L [id number]

## 2020-11-24 ENCOUNTER — APPOINTMENT (OUTPATIENT)
Dept: OCCUPATIONAL THERAPY | Age: 25
End: 2020-11-24
Payer: COMMERCIAL

## 2021-01-21 PROCEDURE — 99283 EMERGENCY DEPT VISIT LOW MDM: CPT

## 2021-01-22 ENCOUNTER — HOSPITAL ENCOUNTER (EMERGENCY)
Age: 26
Discharge: HOME | End: 2021-01-22
Payer: COMMERCIAL

## 2021-01-22 ENCOUNTER — APPOINTMENT (OUTPATIENT)
Dept: GENERAL RADIOLOGY | Age: 26
End: 2021-01-22
Payer: COMMERCIAL

## 2021-01-22 ENCOUNTER — TELEPHONE (OUTPATIENT)
Dept: ORTHOPEDIC SURGERY | Age: 26
End: 2021-01-22

## 2021-01-22 VITALS
RESPIRATION RATE: 18 BRPM | HEIGHT: 66 IN | HEART RATE: 88 BPM | TEMPERATURE: 99.1 F | OXYGEN SATURATION: 97 % | SYSTOLIC BLOOD PRESSURE: 127 MMHG | DIASTOLIC BLOOD PRESSURE: 93 MMHG | WEIGHT: 290 LBS | BODY MASS INDEX: 46.61 KG/M2

## 2021-01-22 DIAGNOSIS — S63.92XA SPRAIN OF LEFT HAND, INITIAL ENCOUNTER: Primary | ICD-10-CM
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
## 2021-01-22 PROCEDURE — 73130 X-RAY EXAM OF HAND: CPT

## 2021-01-22 PROCEDURE — 6370000000 HC RX 637 (ALT 250 FOR IP): Performed by: PHYSICIAN ASSISTANT

## 2021-01-22 RX ORDER — NAPROXEN 500 MG/1
500 TABLET ORAL 2 TIMES DAILY
Qty: 20 TABLET | Refills: 0 | Status: SHIPPED | OUTPATIENT
Start: 2021-01-22 | End: 2021-03-14

## 2021-01-22 RX ORDER — NAPROXEN 250 MG/1
500 TABLET ORAL ONCE
Status: COMPLETED | OUTPATIENT
Start: 2021-01-22 | End: 2021-01-22

## 2021-01-22 RX ADMIN — NAPROXEN 500 MG: 250 TABLET ORAL at 01:13

## 2021-01-22 ASSESSMENT — ENCOUNTER SYMPTOMS
ABDOMINAL PAIN: 0
NAUSEA: 0
SHORTNESS OF BREATH: 0
DIARRHEA: 0
VOMITING: 0

## 2021-01-22 ASSESSMENT — PAIN SCALES - GENERAL: PAINLEVEL_OUTOF10: 8

## 2021-01-22 NOTE — ED PROVIDER NOTES
[address] [facility]        Pt Name: Ru Hein  MRN: [id number]  [place] 1995  Date of evaluation: 1/21/2021  Provider: Emil Harding PA-C  PCP: Gali Glez MD    ROSIE. I have evaluated this patient. My supervising physician was available for consultation. CHIEF COMPLAINT       Chief Complaint   Patient presents with    Hand Injury     Pt states she was counting paper and heard a pop in the left wrist/hand, pt states that it hurts to move wrist and rom decreased. HISTORY OF PRESENT ILLNESS   (Location, Timing/Onset, Context/Setting, Quality, Duration, Modifying Factors, Severity, Associated Signs and Symptoms)  Note limiting factors. Ru Hein is a 23 y.o. female patient presents emergency department for evaluation of pain to her left hand. Patient is right-hand dominant. Patient states she was shuffling through a stack of papers threading them through her fingers when she felt a pop in her left hyperthenar eminence. Patient states she has pain with flexion, extension, ulnar deviation and radial deviation. She states no one movement makes her more are less than the others. Patient states she has tried Tylenol without significant relief of her pain. 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 Patient recently completed some physical therapy due to residual pain in the wrist about 4 months ago. Patient states she has not really had any issues with the wrist since that time. No other injuries to her knowledge. Not have any paresthesias or numbness in her fingers. No decreased strength. Nursing Notes were all reviewed and agreed with or any disagreements were addressed in the HPI. REVIEW OF SYSTEMS    (2-9 systems for level 4, 10 or more for level 5)     Review of Systems   Constitutional: Negative for fatigue and fever.    HENT: Negative. Eyes: Negative for visual disturbance. Respiratory: Negative for shortness of breath. Cardiovascular: Negative for chest pain. Gastrointestinal: Negative for abdominal pain, diarrhea, nausea and vomiting. Genitourinary: Negative. Musculoskeletal: Positive for arthralgias. Skin: Negative. Neurological: Negative. Positives and Pertinent negatives as per HPI. Except as noted above in the ROS, all other systems were reviewed and negative. PAST MEDICAL HISTORY     Past Medical History:   Diagnosis Date    Kidney stone          SURGICAL HISTORY     Past Surgical History:   Procedure Laterality Date    WRIST SURGERY      pin palcement         CURRENTMEDICATIONS       Discharge Medication List as of 1/22/2021  2:40 AM      CONTINUE these medications which have NOT CHANGED    Details   lidocaine (LIDODERM) 5 % Place 1 patch onto the skin daily 12 hours on, 12 hours off., Disp-30 patch, R-0Print               ALLERGIES     Patient has no known allergies. FAMILYHISTORY     History reviewed. No pertinent family history. SOCIAL HISTORY       Social History     Tobacco Use    Smoking status: Never Smoker    Smokeless tobacco: Never Used   Substance Use Topics    Alcohol use: Not Currently    Drug use: Never       SCREENINGS             PHYSICAL EXAM    (up to 7 for level 4, 8 or more for level 5)     ED Triage Vitals [01/22/21 0006]   BP Temp Temp src Pulse Resp SpO2 Height Weight   (!) 127/93 99.1 °F (37.3 °C) -- 88 18 97 % 5' 6\" (1.676 m) 290 lb (131.5 kg)       Physical Exam  Vitals signs and nursing note reviewed. Constitutional:       General: She is not in acute distress. Appearance: Normal appearance. She is well-developed. She is not ill-appearing, toxic-appearing or diaphoretic. HENT:      Head: Normocephalic and atraumatic. Nose: Nose normal.      Mouth/Throat:      Mouth: Mucous membranes are moist.      Pharynx: Oropharynx is clear.    Eyes: General:         Right eye: No discharge. Left eye: No discharge. Conjunctiva/sclera: Conjunctivae normal.      Pupils: Pupils are equal, round, and reactive to light. Neck:      Musculoskeletal: Normal range of motion and neck supple. Cardiovascular:      Rate and Rhythm: Normal rate and regular rhythm. Pulses: Normal pulses. Pulmonary:      Effort: Pulmonary effort is normal.   Musculoskeletal: Normal range of motion. Right wrist: Normal.      Left wrist: She exhibits tenderness. Left hand: She exhibits tenderness and bony tenderness. She exhibits normal range of motion, normal capillary refill, no deformity, no laceration and no swelling. Normal sensation noted. Normal strength noted. She exhibits no finger abduction and no thumb/finger opposition. Hands:    Skin:     General: Skin is warm and dry. Capillary Refill: Capillary refill takes less than 2 seconds. Coloration: Skin is not jaundiced or pale. Findings: No rash. Neurological:      General: No focal deficit present. Mental Status: She is alert and oriented to person, place, and time. Psychiatric:         Mood and Affect: Mood normal.         Behavior: Behavior normal.         DIAGNOSTIC RESULTS   LABS:    Labs Reviewed - No data to display    All other labs were within normal range or not returned as of this dictation. EKG: All EKG's are interpreted by the Emergency Department Physician in the absence of a cardiologist.  Please see their note for interpretation of EKG. RADIOLOGY:   Non-plain film images such as CT, Ultrasound and MRI are read by the radiologist. Plain radiographic images are visualized and preliminarily interpreted by the ED Provider with the below findings:        Interpretation per the Radiologist below, if available at the time of this note:    XR HAND LEFT (MIN 3 VIEWS)   Final Result   No acute findings. No results found.         PROCEDURES   Unless otherwise noted below, none     Procedures    CRITICAL CARE TIME   N/A    CONSULTS:  None      EMERGENCY DEPARTMENT COURSE and DIFFERENTIAL DIAGNOSIS/MDM:   Vitals:    Vitals:    01/22/21 0006   BP: (!) 127/93   Pulse: 88   Resp: 18   Temp: 99.1 °F (37.3 °C)   SpO2: 97%   Weight: 290 lb (131.5 kg)   Height: 5' 6\" (1.676 m)       Patient was given the following medications:  Medications   naproxen (NAPROSYN) tablet 500 mg (500 mg Oral Given 1/22/21 0113)         For evaluation of left hand injury. Patient is right-hand dominant. Patient states she had acute discomfort in her left hypothenar eminence while at work. She had her fingers spread apart and was filing through papers. 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 Patient has had some residual discomfort requiring physical therapy recently since physical therapy has felt much better. Patient has mild tenderness to the hyperthenar eminence and distal ulna. She has mild discomfort with both flexion, extension, ulnar deviation and radial deviation. She states that no one movement hurts more than the rest.  She has no decreased strength to thumb and finger opposition. Normal strength to handgrip. No loss of sensation to light touch. Radial pulses 2+. Capillary refills less than 2 seconds. Patient does not have any swelling or erythema to the hypothenar eminence. X-ray shows no acute bony fracture. At this time I suspect strain. Patient has a wrist brace at home and she was encouraged to wear this as well as using ice, rest and Naprosyn twice daily for her discomfort. Patient is given a referral to hand surgery if her discomfort continues. Patient is amenable to this plan at this time will be discharged home.   I estimate there is LOW risk for FRACTURE, COMPARTMENT SYNDROME, DEEP VENOUS THROMBOSIS, SEPTIC ARTHRITIS, TENDON OR NEUROVASCULAR INJURY, thus I consider the discharge disposition reasonable. FINAL IMPRESSION      1. Sprain of left hand, initial encounter    2.  Work related injury          DISPOSITION/PLAN   DISPOSITION Decision To Discharge 01/22/2021 02:36:30 AM      PATIENT REFERREDTO:  [facility] Emergency Department  [address] [facility]  [phone]    If symptoms worsen    Loring Gaucher, MD  [address] [place] [address]  [phone]    Schedule an appointment as soon as possible for a visit in 3 days  for re-evaluation      DISCHARGE MEDICATIONS:  Discharge Medication List as of 1/22/2021  2:40 AM          DISCONTINUED MEDICATIONS:  Discharge Medication List as of 1/22/2021  2:40 AM                 (Please note that portions of this note were completed with a voice recognition program.  Efforts were made to edit the dictations but occasionally words are mis-transcribed.)    Silvano Richardson PA-C (electronically signed)            Silvano Richardson PA-C  01/22/21 SARAH Berman  01/22/21 1111

## 2021-01-22 NOTE — LETTER
[facility] Emergency Department  [address] [place] [address], [address]             January 22, 2021    Patient: Elaina Ma   YOB: 1995   Date of Visit: 1/21/2021       To Whom It May Concern:    Elaina Ma was seen and treated in our emergency department on 1/21/2021. She may return to work on 1/24/21.       Sincerely,         [facility]

## 2021-03-14 ENCOUNTER — HOSPITAL ENCOUNTER (EMERGENCY)
Age: 26
Discharge: HOME | End: 2021-03-14
Payer: COMMERCIAL

## 2021-03-14 ENCOUNTER — APPOINTMENT (OUTPATIENT)
Dept: GENERAL RADIOLOGY | Age: 26
End: 2021-03-14
Payer: COMMERCIAL

## 2021-03-14 VITALS
SYSTOLIC BLOOD PRESSURE: 142 MMHG | BODY MASS INDEX: 48.82 KG/M2 | WEIGHT: 293 LBS | HEIGHT: 65 IN | TEMPERATURE: 98.3 F | RESPIRATION RATE: 16 BRPM | HEART RATE: 88 BPM | DIASTOLIC BLOOD PRESSURE: 81 MMHG | OXYGEN SATURATION: 99 %

## 2021-03-14 DIAGNOSIS — S29.012A RHOMBOID MUSCLE STRAIN, INITIAL ENCOUNTER: Primary | ICD-10-CM

## 2021-03-14 PROCEDURE — 73030 X-RAY EXAM OF SHOULDER: CPT

## 2021-03-14 PROCEDURE — 99283 EMERGENCY DEPT VISIT LOW MDM: CPT

## 2021-03-14 RX ORDER — NAPROXEN 500 MG/1
500 TABLET ORAL 2 TIMES DAILY WITH MEALS
Qty: 30 TABLET | Refills: 0 | Status: SHIPPED | OUTPATIENT
Start: 2021-03-14

## 2021-03-14 RX ORDER — METHOCARBAMOL 750 MG/1
750 TABLET, FILM COATED ORAL 4 TIMES DAILY
Qty: 40 TABLET | Refills: 0 | Status: SHIPPED | OUTPATIENT
Start: 2021-03-14 | End: 2021-03-14 | Stop reason: SDUPTHER

## 2021-03-14 RX ORDER — METHOCARBAMOL 750 MG/1
750 TABLET, FILM COATED ORAL 4 TIMES DAILY
Qty: 40 TABLET | Refills: 0 | Status: SHIPPED | OUTPATIENT
Start: 2021-03-14 | End: 2021-03-24

## 2021-03-14 RX ORDER — LIDOCAINE 50 MG/G
1 PATCH TOPICAL DAILY
Qty: 30 PATCH | Refills: 0 | Status: SHIPPED | OUTPATIENT
Start: 2021-03-14

## 2021-03-14 RX ORDER — NAPROXEN 500 MG/1
500 TABLET ORAL 2 TIMES DAILY WITH MEALS
Qty: 30 TABLET | Refills: 0 | Status: SHIPPED | OUTPATIENT
Start: 2021-03-14 | End: 2021-03-14 | Stop reason: SDUPTHER

## 2021-03-14 ASSESSMENT — ENCOUNTER SYMPTOMS
COUGH: 0
SHORTNESS OF BREATH: 0
RESPIRATORY NEGATIVE: 1
CHEST TIGHTNESS: 0
ABDOMINAL PAIN: 0
BACK PAIN: 1
VOMITING: 0
NAUSEA: 0
CONSTIPATION: 0
COLOR CHANGE: 0
DIARRHEA: 0

## 2021-03-14 ASSESSMENT — PAIN SCALES - GENERAL: PAINLEVEL_OUTOF10: 8

## 2021-03-14 NOTE — ED PROVIDER NOTES
[address] ENCOUNTER        Pt Name: Leah Mullins  MRN: [id number]  [place] 1995  Date of evaluation: 3/14/2021  Provider: GENEVA Beach  PCP: Suly Aldana MD    ROSIE. I have evaluated this patient. My supervising physician was available for consultation. CHIEF COMPLAINT       Chief Complaint   Patient presents with    Back Pain     pt c/o upper back pain at right scapula. no known injury but does state she lifts heavy things at work sometimes. HISTORY OF PRESENT ILLNESS   (Location, Timing/Onset, Context/Setting, Quality, Duration, Modifying Factors, Severity, Associated Signs and Symptoms)  Note limiting factors. Leah Mullins is a 23 y.o. female with no significant past medical history who presents to the ED with complaint of right upper back pain. Patient states he has had pain to her right upper back along her shoulder blade. States been present for the past week. Patient denies any injury or trauma. States she does do some heavy lifting at work occasionally. States she is right-hand dominant. Patient denies any chest pain or shortness of breath. Denies abdominal pain, nausea/vomiting, urinary symptoms or changes in bowel movements. Patient states aching pain rated 8/10 to the right upper back worsened with palpation and movement of the right arm. Patient denies edema, ecchymosis, erythema or warmth. Denies fever chills. Denies numbness or tingling. Denies abrasion or laceration. Patient denies any radiation to her symptoms specifically down the arm. Denies headache, neck pain/stiffness, lightheadedness/dizziness, visual changes or speech disturbances. Has tried some Naprosyn, Lidoderm patches and heat at home with minimal improvement of symptoms. Nursing Notes were all reviewed and agreed with or any disagreements were addressed in the HPI.     REVIEW OF SYSTEMS    (2-9 systems for level 4, 10 or more for level 5)     Review of Systems   Constitutional: Negative for activity change, appetite change, chills and fever. Respiratory: Negative. Negative for cough, chest tightness and shortness of breath. Cardiovascular: Negative. Negative for chest pain, palpitations and leg swelling. Gastrointestinal: Negative for abdominal pain, constipation, diarrhea, nausea and vomiting. Genitourinary: Negative for decreased urine volume, difficulty urinating, dysuria, flank pain, frequency, hematuria and urgency. Musculoskeletal: Positive for arthralgias, back pain and myalgias. Negative for gait problem, joint swelling, neck pain and neck stiffness. Skin: Negative for color change, pallor, rash and wound. Neurological: Negative for dizziness, light-headedness and headaches. Positives and Pertinent negatives as per HPI. Except as noted above in the ROS, all other systems were reviewed and negative. PAST MEDICAL HISTORY     Past Medical History:   Diagnosis Date    Kidney stone          SURGICAL HISTORY     Past Surgical History:   Procedure Laterality Date    WRIST SURGERY      pin palcement         CURRENTMEDICATIONS       Discharge Medication List as of 3/14/2021  3:46 PM      CONTINUE these medications which have NOT CHANGED    Details   lidocaine (LIDODERM) 5 % Place 1 patch onto the skin daily 12 hours on, 12 hours off., Disp-30 patch, R-0Print               ALLERGIES     Patient has no known allergies. FAMILYHISTORY     History reviewed. No pertinent family history.        SOCIAL HISTORY       Social History     Tobacco Use    Smoking status: Never Smoker    Smokeless tobacco: Never Used   Substance Use Topics    Alcohol use: Not Currently    Drug use: Never       SCREENINGS             PHYSICAL EXAM    (up to 7 for level 4, 8 or more for level 5)     ED Triage Vitals [03/14/21 1424]   BP Temp Temp Source Pulse Resp SpO2 Height Weight   (!) 142/81 98.3 °F (36.8 °C) Oral 88 16 99 % 5' 5\" (1.651 m) 298 lb (135.2 kg)       Physical Exam  Constitutional:       General: She is not in acute distress. Appearance: Normal appearance. She is well-developed. She is not ill-appearing, toxic-appearing or diaphoretic. HENT:      Head: Normocephalic and atraumatic. Right Ear: External ear normal.      Left Ear: External ear normal.   Eyes:      General:         Right eye: No discharge. Left eye: No discharge. Neck:      Musculoskeletal: Normal range of motion and neck supple. Cardiovascular:      Rate and Rhythm: Normal rate and regular rhythm. Pulses: Normal pulses. Heart sounds: Normal heart sounds. No murmur. No friction rub. No gallop. Comments: 2+ radial pulses bilaterally. Pulmonary:      Effort: Pulmonary effort is normal. No respiratory distress. Breath sounds: Normal breath sounds. No stridor. No wheezing, rhonchi or rales. Chest:      Chest wall: No tenderness. Abdominal:      General: Abdomen is flat. There is no distension. Palpations: Abdomen is soft. There is no mass. Tenderness: There is no abdominal tenderness. There is no right CVA tenderness, left CVA tenderness, guarding or rebound. Hernia: No hernia is present. Musculoskeletal: Normal range of motion. Comments: Upon examination patient has tenderness to palpation over the right upper thoracic back. Tenderness over the right rhomboid musculature. There is no bony tenderness noted over the scapula or shoulder. No midline tenderness of the cervical, thoracic or lumbar spine. No crepitus or step-off. No rashes or lesions. No skin changes. No CVA tenderness. Full range of motion strength to the bilateral shoulders, elbows and wrist.  Full range of motion and strength in distal median, ulnar and radial nerve distribution. 2+ radial pulses bilaterally. Capillary refill brisk bilaterally.   Sensation intact to the radial ulnar aspects of distal fingertips. Patient has reproducible tenderness to palpation of the right upper back and also with movement of the right shoulder/scapula. Skin:     General: Skin is warm and dry. Coloration: Skin is not pale. Findings: No erythema. Neurological:      Mental Status: She is alert and oriented to person, place, and time. Psychiatric:         Behavior: Behavior normal.         DIAGNOSTIC RESULTS   LABS:    Labs Reviewed - No data to display    All other labs were within normal range or not returned as of this dictation. EKG: All EKG's are interpreted by the Emergency Department Physician in the absence of a cardiologist.  Please see their note for interpretation of EKG. RADIOLOGY:   Non-plain film images such as CT, Ultrasound and MRI are read by the radiologist. Plain radiographic images are visualized and preliminarily interpreted by the ED Provider with the below findings:        Interpretation per the Radiologist below, if available at the time of this note:    XR SHOULDER RIGHT (MIN 2 VIEWS)   Final Result   No acute abnormality. No results found. PROCEDURES   Unless otherwise noted below, none     Procedures    CRITICAL CARE TIME   N/A    CONSULTS:  None      EMERGENCY DEPARTMENT COURSE and DIFFERENTIAL DIAGNOSIS/MDM:   Vitals:    Vitals:    03/14/21 1424   BP: (!) 142/81   Pulse: 88   Resp: 16   Temp: 98.3 °F (36.8 °C)   TempSrc: Oral   SpO2: 99%   Weight: 298 lb (135.2 kg)   Height: 5' 5\" (1.651 m)       Patient was given the following medications:  Medications - No data to display        Patient is a 37-year-old female who presents to the ED with complaint of upper back pain. Right upper back pain with tenderness over the rhomboid worsened with movement and palpation. X-ray of the shoulder obtained and showed no acute abnormality.   Given history and physical examination patient suffering from upper back pain which I believe is most likely musculoskeletal.  Will give muscle relaxer for home. Continue Lidoderm patches and anti-inflammatories at home. Low suspicion for acute fracture, dislocation, septic arthritis, gout, cellulitis, abscess, DVT, arterial occlusion, nerve involvement, vascular compromise, compartment syndrome, intrathoracic abnormality, abdominal abnormality or other emergent etiology at this time. FINAL IMPRESSION      1. Rhomboid muscle strain, initial encounter          DISPOSITION/PLAN   DISPOSITION Decision To Discharge 03/14/2021 03:37:24 PM      PATIENT REFERREDTO:  Candida Morel MD  [address] [facility]  [phone]    Schedule an appointment as soon as possible for a visit   As needed, If symptoms worsen    [facility] Emergency Department  [address] [facility]  [phone]  Go to   As needed, If symptoms worsen      DISCHARGE MEDICATIONS:  Discharge Medication List as of 3/14/2021  3:46 PM      START taking these medications    Details   methocarbamol (ROBAXIN-750) 750 MG tablet Take 1 tablet by mouth 4 times daily for 10 days Use as needed for muscle pain or soreness.  May take 2 at bedtime to aid sleep., Disp-40 tablet, R-0Print             DISCONTINUED MEDICATIONS:  Discharge Medication List as of 3/14/2021  3:46 PM                 (Please note that portions of this note were completed with a voice recognition program.  Efforts were made to edit the dictations but occasionally words are mis-transcribed.)    GENEVA Foreman (electronically signed)          GENEVA Marcelo  03/14/21 2197

## 2021-04-16 ENCOUNTER — HOSPITAL ENCOUNTER (EMERGENCY)
Age: 26
Discharge: HOME | End: 2021-04-16
Payer: COMMERCIAL

## 2021-04-16 VITALS
RESPIRATION RATE: 18 BRPM | TEMPERATURE: 97.7 F | DIASTOLIC BLOOD PRESSURE: 89 MMHG | HEART RATE: 99 BPM | OXYGEN SATURATION: 97 % | SYSTOLIC BLOOD PRESSURE: 136 MMHG

## 2021-04-16 DIAGNOSIS — R21 RASH AND OTHER NONSPECIFIC SKIN ERUPTION: Primary | ICD-10-CM

## 2021-04-16 PROCEDURE — 99283 EMERGENCY DEPT VISIT LOW MDM: CPT

## 2021-04-16 RX ORDER — HYDROXYZINE PAMOATE 25 MG/1
25 CAPSULE ORAL 3 TIMES DAILY PRN
Qty: 30 CAPSULE | Refills: 0 | Status: SHIPPED | OUTPATIENT
Start: 2021-04-16 | End: 2021-04-30

## 2021-04-16 RX ORDER — DIAPER,BRIEF,INFANT-TODD,DISP
EACH MISCELLANEOUS
Qty: 1 TUBE | Refills: 1 | Status: SHIPPED | OUTPATIENT
Start: 2021-04-16 | End: 2021-04-23

## 2021-04-16 ASSESSMENT — ENCOUNTER SYMPTOMS
RHINORRHEA: 0
SHORTNESS OF BREATH: 0
NAUSEA: 0
DIARRHEA: 0
COUGH: 0
ABDOMINAL PAIN: 0
VOMITING: 0

## 2021-04-16 NOTE — ED PROVIDER NOTES
[address] ENCOUNTER        Pt Name: Ashley Mata  MRN: [id number]  [place] 1995  Date of evaluation: 4/16/2021  Provider: Raji Adhikari PA-C  PCP: Armando Huang MD    ROSIE. I have evaluated this patient. My supervising physician was available for consultation. CHIEF COMPLAINT       Chief Complaint   Patient presents with    Rash     Rash to neck & left shoulder, since Wednesday. Denies drainage or pain; just itching       HISTORY OF PRESENT ILLNESS   (Location, Timing/Onset, Context/Setting, Quality, Duration, Modifying Factors, Severity, Associated Signs and Symptoms)  Note limiting factors. Ashley Mata is a 23 y.o. female who presents to the emergency department today for evaluation for a rash. The patient states that she has had a rash to her neck, and left arm, and right arm, and she states that this has been there for approximately 2 days. The patient states that on Tuesday she did use a new soap, and she states that she broke out in a rash the next day. The patient states that she did not change out her loofah, and she went back to her normal soap, she states that she continues to have the rash, which prompted her visit to the ED. She states that the rash is itching however she denies any pain to the rash she denies any drainage from the rash. She has no fever chills. No nausea or vomiting. No chest pain or shortness of breath. She denies any other locations of the rash. She denies any other new soaps, detergents or medications. No known 6 contacts. No other household members with a rash, no other complaints    Nursing Notes were all reviewed and agreed with or any disagreements were addressed in the HPI. REVIEW OF SYSTEMS    (2-9 systems for level 4, 10 or more for level 5)     Review of Systems   Constitutional: Negative for activity change, appetite change, chills and fever.    HENT: Negative for discharge. Left eye: No discharge. Neck:      Musculoskeletal: Normal range of motion and neck supple. Trachea: No tracheal deviation. Pulmonary:      Effort: Pulmonary effort is normal. No respiratory distress. Musculoskeletal: Normal range of motion. Skin:     General: Skin is warm and dry. Findings: Rash present. Comments: Scattered erythematous papules and wheals noted to the left upper extremity, right upper extremity, and posterior neck. No areas noted to the palms, chest or abdomen. No petechia or purpura. No target lesions. No drainage. No signs of any secondary infection excoriations noted   Neurological:      Mental Status: She is alert and oriented to person, place, and time. Psychiatric:         Behavior: Behavior normal.         DIAGNOSTIC RESULTS   LABS:    Labs Reviewed - No data to display    All other labs were within normal range or not returned as of this dictation. EKG: All EKG's are interpreted by the Emergency Department Physician in the absence of a cardiologist.  Please see their note for interpretation of EKG. RADIOLOGY:   Non-plain film images such as CT, Ultrasound and MRI are read by the radiologist. Plain radiographic images are visualized and preliminarily interpreted by the ED Provider with the below findings:        Interpretation per the Radiologist below, if available at the time of this note:    No orders to display     No results found.         PROCEDURES   Unless otherwise noted below, none     Procedures    CRITICAL CARE TIME   N/A    CONSULTS:  None      EMERGENCY DEPARTMENT COURSE and DIFFERENTIAL DIAGNOSIS/MDM:   Vitals:    Vitals:    04/16/21 1631   BP: 136/89   Pulse: 99   Resp: 18   Temp: 97.7 °F (36.5 °C)   TempSrc: Temporal   SpO2: 97%       Patient was given the following medications:  Medications - No data to display        Briefly, this is a 68-year-old female who presents the emergency department today for evaluation for a rash.  The patient states that she used a new soap on Tuesday, and she states that the next day she began with a rash. Physical exam she does have blanchable erythematous papules and wheals noted to the posterior neck, as well as the left upper extremity and right upper extremity. I did discuss with the patient that her symptoms today could be due to contact dermatitis, and as she did not change of her lupus there could be some remaining also which could be causing her reaction. The patient states that she will obtain an implant she will not use a new soap anymore. She will be given a prescription for hydrocortisone cream as well as Vistaril. She is routine follow-up with her primary care physician within 2 to 3 days for reevaluation. She is to return to the ED for any new or worsening symptoms. The patient voiced understanding is agreeable with plan. Stable for discharge. My suspicion is low at this time for Johnson-Naveed syndrome, erythema multiforme, cellulitis, abscess, necrotizing fasciitis or other emergent etiology. FINAL IMPRESSION      1. Rash and other nonspecific skin eruption          DISPOSITION/PLAN   DISPOSITION Discharge - Pending Orders Complete 04/16/2021 04:28:15 PM      PATIENT REFERREDTO:  Nereida Varghese MD  [address] [facility] [address]  [facility]  [phone]    Schedule an appointment as soon as possible for a visit in 2 days      [facility] Emergency Department  [address] [place] [address]  [phone]    If symptoms worsen, As needed      DISCHARGE MEDICATIONS:  New Prescriptions    HYDROCORTISONE 1 % CREAM    Apply topically 2 -3 times daily for 5 - 7 days.     HYDROXYZINE (VISTARIL) 25 MG CAPSULE    Take 1 capsule by mouth 3 times daily as needed for Itching       DISCONTINUED MEDICATIONS:  Discontinued Medications    No medications on file              (Please note that portions of this note were completed with a voice recognition

## 2021-08-07 ENCOUNTER — HOSPITAL ENCOUNTER (EMERGENCY)
Age: 26
Discharge: HOME | End: 2021-08-07
Attending: EMERGENCY MEDICINE
Payer: COMMERCIAL

## 2021-08-07 ENCOUNTER — APPOINTMENT (OUTPATIENT)
Dept: CT IMAGING | Age: 26
End: 2021-08-07
Payer: COMMERCIAL

## 2021-08-07 VITALS
OXYGEN SATURATION: 98 % | RESPIRATION RATE: 20 BRPM | SYSTOLIC BLOOD PRESSURE: 118 MMHG | TEMPERATURE: 97.3 F | DIASTOLIC BLOOD PRESSURE: 59 MMHG | BODY MASS INDEX: 47.09 KG/M2 | HEART RATE: 77 BPM | HEIGHT: 66 IN | WEIGHT: 293 LBS

## 2021-08-07 DIAGNOSIS — R10.9 LEFT FLANK PAIN: Primary | ICD-10-CM

## 2021-08-07 LAB
ANION GAP SERPL CALCULATED.3IONS-SCNC: 10 MMOL/L (ref 3–16)
BACTERIA: ABNORMAL /HPF
BASOPHILS ABSOLUTE: 0.1 K/UL (ref 0–0.2)
BASOPHILS RELATIVE PERCENT: 0.7 %
BILIRUBIN URINE: NEGATIVE
BLOOD, URINE: ABNORMAL
BUN BLDV-MCNC: 12 MG/DL (ref 7–20)
CALCIUM SERPL-MCNC: 9.7 MG/DL (ref 8.3–10.6)
CHLORIDE BLD-SCNC: 105 MMOL/L (ref 99–110)
CLARITY: ABNORMAL
CO2: 25 MMOL/L (ref 21–32)
COLOR: ABNORMAL
CREAT SERPL-MCNC: 0.6 MG/DL (ref 0.6–1.1)
EOSINOPHILS ABSOLUTE: 0.2 K/UL (ref 0–0.6)
EOSINOPHILS RELATIVE PERCENT: 1.9 %
EPITHELIAL CELLS, UA: 4 /HPF (ref 0–5)
GFR AFRICAN AMERICAN: >60
GFR NON-AFRICAN AMERICAN: >60
GLUCOSE BLD-MCNC: 77 MG/DL (ref 70–99)
GLUCOSE URINE: NEGATIVE MG/DL
HCG QUALITATIVE: NEGATIVE
HCT VFR BLD CALC: 40.7 % (ref 36–48)
HEMOGLOBIN: 13.9 G/DL (ref 12–16)
HYALINE CASTS: 4 /LPF (ref 0–8)
KETONES, URINE: NEGATIVE MG/DL
LEUKOCYTE ESTERASE, URINE: NEGATIVE
LYMPHOCYTES ABSOLUTE: 3.1 K/UL (ref 1–5.1)
LYMPHOCYTES RELATIVE PERCENT: 29.4 %
MCH RBC QN AUTO: 28.1 PG (ref 26–34)
MCHC RBC AUTO-ENTMCNC: 34.1 G/DL (ref 31–36)
MCV RBC AUTO: 82.4 FL (ref 80–100)
MICROSCOPIC EXAMINATION: YES
MONOCYTES ABSOLUTE: 1 K/UL (ref 0–1.3)
MONOCYTES RELATIVE PERCENT: 9.7 %
NEUTROPHILS ABSOLUTE: 6.1 K/UL (ref 1.7–7.7)
NEUTROPHILS RELATIVE PERCENT: 58.3 %
NITRITE, URINE: NEGATIVE
PDW BLD-RTO: 14.1 % (ref 12.4–15.4)
PH UA: 6 (ref 5–8)
PLATELET # BLD: 407 K/UL (ref 135–450)
PMV BLD AUTO: 7.2 FL (ref 5–10.5)
POTASSIUM SERPL-SCNC: 3.4 MMOL/L (ref 3.5–5.1)
PROTEIN UA: 30 MG/DL
RBC # BLD: 4.94 M/UL (ref 4–5.2)
RBC UA: ABNORMAL /HPF (ref 0–4)
SODIUM BLD-SCNC: 140 MMOL/L (ref 136–145)
SPECIFIC GRAVITY UA: 1.02 (ref 1–1.03)
URINE REFLEX TO CULTURE: YES
URINE TYPE: ABNORMAL
UROBILINOGEN, URINE: 0.2 E.U./DL
WBC # BLD: 10.5 K/UL (ref 4–11)
WBC UA: 10 /HPF (ref 0–5)

## 2021-08-07 PROCEDURE — 80048 BASIC METABOLIC PNL TOTAL CA: CPT

## 2021-08-07 PROCEDURE — 84703 CHORIONIC GONADOTROPIN ASSAY: CPT

## 2021-08-07 PROCEDURE — 74176 CT ABD & PELVIS W/O CONTRAST: CPT

## 2021-08-07 PROCEDURE — 87086 URINE CULTURE/COLONY COUNT: CPT

## 2021-08-07 PROCEDURE — 81001 URINALYSIS AUTO W/SCOPE: CPT

## 2021-08-07 PROCEDURE — 85025 COMPLETE CBC W/AUTO DIFF WBC: CPT

## 2021-08-07 PROCEDURE — 36415 COLL VENOUS BLD VENIPUNCTURE: CPT

## 2021-08-07 PROCEDURE — 96372 THER/PROPH/DIAG INJ SC/IM: CPT

## 2021-08-07 PROCEDURE — 6360000002 HC RX W HCPCS: Performed by: EMERGENCY MEDICINE

## 2021-08-07 PROCEDURE — 99282 EMERGENCY DEPT VISIT SF MDM: CPT

## 2021-08-07 RX ORDER — METHOCARBAMOL 750 MG/1
750-1500 TABLET, FILM COATED ORAL 3 TIMES DAILY
Qty: 15 TABLET | Refills: 0 | Status: SHIPPED | OUTPATIENT
Start: 2021-08-07 | End: 2021-08-12

## 2021-08-07 RX ORDER — NAPROXEN 500 MG/1
500 TABLET ORAL 2 TIMES DAILY PRN
Qty: 20 TABLET | Refills: 0 | Status: SHIPPED | OUTPATIENT
Start: 2021-08-07

## 2021-08-07 RX ORDER — KETOROLAC TROMETHAMINE 30 MG/ML
30 INJECTION, SOLUTION INTRAMUSCULAR; INTRAVENOUS ONCE
Status: COMPLETED | OUTPATIENT
Start: 2021-08-07 | End: 2021-08-07

## 2021-08-07 RX ADMIN — KETOROLAC TROMETHAMINE 30 MG: 30 INJECTION, SOLUTION INTRAMUSCULAR; INTRAVENOUS at 17:11

## 2021-08-07 ASSESSMENT — PAIN SCALES - GENERAL
PAINLEVEL_OUTOF10: 8
PAINLEVEL_OUTOF10: 8

## 2021-08-07 NOTE — ED PROVIDER NOTES
[facility] Emergency Department      Pt Name: Ashwin Ellington  MRN: [id number]  [place] 1995  Date of evaluation: 8/7/2021  Provider: Julia Hubbard MD  CHIEF COMPLAINT  Chief Complaint   Patient presents with    Flank Pain     Pt c/o left flank pain since yesterday with nausea. Denies urinary sx. Hx kidney stones. HPI  Ashwin Ellington is a 23 y.o. female who presents because of flank pain. Pain started yesterday about noon time. She has had some nausea without vomiting. She has a history of kidney stones. She has required lithotripsy one time because the stone was 6 mm and did not pass on its own. She denies any fever. She feels like this pain is similar to discomfort she has had with other kidney stones though this time is on the left side rather than the right. The pain is mid back on the left and does not radiate. She has no abdominal pain. She had some darker appearing urine last week but denies any saloni hematuria. Denies any known injury. She took some Tylenol yesterday and today for pain and also tried a Lidoderm patch. She rates her pain 8 on a scale of 10. She does have increased pain with movement. REVIEW OF SYSTEMS:  No fever, no chest pain, no bowel pattern change, currently on menses Pertinent positives and negatives as per the HPI. All other review of systems reviewed and negative. Nursing notes reviewed. PAST MEDICAL HISTORY  Past Medical History:   Diagnosis Date    Kidney stone      SURGICAL HISTORY  Past Surgical History:   Procedure Laterality Date    WRIST SURGERY      pin palcement     MEDICATIONS:  No current facility-administered medications on file prior to encounter. Current Outpatient Medications on File Prior to Encounter   Medication Sig Dispense Refill    lidocaine (LIDODERM) 5 % Place 1 patch onto the skin daily 12 hours on, 12 hours off.  30 patch 0    naproxen (NAPROSYN) 500 MG tablet Take 1 tablet by mouth 2 times daily (with meals) 30 tablet 0     ALLERGIES  Patient has no known allergies. FAMILY HISTORY:  History reviewed. No pertinent family history. SOCIAL HISTORY:  Social History     Tobacco Use    Smoking status: Never Smoker    Smokeless tobacco: Never Used   Vaping Use    Vaping Use: Never used   Substance Use Topics    Alcohol use: Not Currently    Drug use: Never     IMMUNIZATIONS:  Noncontributory    PHYSICAL EXAM  VITAL SIGNS:  Blood pressure (!) 118/59, pulse 77, temperature 97.3 °F (36.3 °C), temperature source Oral, resp. rate 20, height 5' 6\" (1.676 m), weight (!) 304 lb 12.8 oz (138.3 kg), last menstrual period 08/07/2021, SpO2 98 %. Constitutional:  23 y.o. female alert, cooperative, smiling and well appearing  HENT:  Atraumatic, mucous membranes moist  Eyes:   Conjunctiva clear, no icterus  Neck:  Supple, no adenopathy, no JVD  Cardiovascular:  Regular, no rubs, no discernible murmur  Thorax & Lungs:  No accessory muscle usage, clear  Abdomen:  Soft, non distended, no pulsatility or bruits, bowel sounds present, NT  Back:  No deformity, no bruising, CVA tenderness minimal, no rash  Genitalia:  No groin swelling  Rectal:  Deferred  Extremities:  No cyanosis, no edema, capillary refill distally intact  Skin:  Warm, dry  Neurologic:  Alert, no slurred speech, no focal deficits, coordination of extremities normal  Psychiatric:  Affect appropriate    DIAGNOSTIC RESULTS:  Labs resulted at the time of this note reviewed.   Labs Reviewed   BASIC METABOLIC PANEL - Abnormal; Notable for the following components:       Result Value    Potassium 3.4 (*)     All other components within normal limits    Narrative:     Performed at:  [facility] [address], [place], [address]   Phone [phone]   URINE RT REFLEX TO CULTURE - Abnormal; Notable for the following components:    Clarity, UA CLOUDY (*)     Blood, Urine LARGE (*)     Protein, UA 30 (*)     All other components within normal limits Narrative:     Performed at:  [facility]  [address] [facility],  [address], [address]   Phone [phone]   MICROSCOPIC URINALYSIS - Abnormal; Notable for the following components:    RBC, UA 21-50 (*)     Bacteria, UA 1+ (*)     WBC, UA 10 (*)     All other components within normal limits    Narrative:     Performed at:  [facility]  [address],  [address], [address]   Phone [phone]   CULTURE, URINE   CBC WITH AUTO DIFFERENTIAL    Narrative:     Performed at:  [facility]  [address] [facility],  [address], [address]   Phone [phone]   HCG, SERUM, QUALITATIVE    Narrative:     Performed at:  [facility]  [address] [facility],  [address], [address]   Phone [phone]     RADIOLOGY:    CT ABDOMEN PELVIS WO CONTRAST Additional Contrast? None   Final Result   No acute intra-abdominal abnormality. No renal calculus or hydronephrosis. Normal appendix. No ovarian cyst or free pelvic fluid. Multiple very small   mesenteric lymph nodes without inflammation. Lymph nodes considered to be   within normal limits. ED COURSE:    Medications administered:  Medications   ketorolac (TORADOL) injection 30 mg (30 mg Intramuscular Given 8/7/21 1711)     PROCEDURES:  None    CRITICAL CARE:  None    CONSULTATIONS:  None    MEDICAL DECISION MAKING: Anuja Simmons is a 23 y.o. female who presented because of flank pain. The cause for her pain is not certain. Clinical exam is benign and musculoskeletal cause is also in the differential.  Hematuria likely contamination from menses. Based on history, physical exam, and testing my suspicion is low for bowel or biliary obstruction, cholangitis, perforated viscous, appendicitis, ectopic pregnancy, torsion amongst other emergent conditions. Anuja Simmons was given appropriate discharge instructions. Referral to follow up provider. Differential diagnosis:  Renal colic, AAA dissection, pyelonephritis, other  Discharge Medication List as of 8/7/2021  7:43 PM      START taking these medications    Details   !! naproxen (NAPROSYN) 500 MG tablet Take 1 tablet by mouth 2 times daily as needed for Pain, Disp-20 tablet, R-0Print      methocarbamol (ROBAXIN-750) 750 MG tablet Take 1-2 tablets by mouth 3 times daily for 15 doses, Disp-15 tablet, R-0Print       !! - Potential duplicate medications found. Please discuss with provider. FOLLOW UP:    Nancy Real MD  [address] [place] [address]  [phone]    Schedule an appointment as soon as possible for a visit       [facility] Emergency Department  [address]  Go to   If symptoms worsen    FINAL IMPRESSION:    1. Left flank pain        (Please note that I used voice recognition software to generate this note.   Occasionally words are mistranscribed despite my efforts to edit errors.)        Zonia Baldwin MD  08/08/21 0892

## 2021-08-07 NOTE — ED NOTES
Pt discharged in stable condition, VSS, no signs of distress. Discharge instructions and meds reviewed. Pt verbalizes understanding and states no further questions or concerns unaddressed.        Hai Diaz RN  08/07/21 2328

## 2021-08-09 ENCOUNTER — HOSPITAL ENCOUNTER (EMERGENCY)
Age: 26
Discharge: HOME | End: 2021-08-09
Attending: STUDENT IN AN ORGANIZED HEALTH CARE EDUCATION/TRAINING PROGRAM
Payer: COMMERCIAL

## 2021-08-09 VITALS
TEMPERATURE: 97.7 F | DIASTOLIC BLOOD PRESSURE: 91 MMHG | HEART RATE: 82 BPM | SYSTOLIC BLOOD PRESSURE: 150 MMHG | RESPIRATION RATE: 20 BRPM | OXYGEN SATURATION: 99 %

## 2021-08-09 DIAGNOSIS — N39.0 URINARY TRACT INFECTION WITHOUT HEMATURIA, SITE UNSPECIFIED: ICD-10-CM

## 2021-08-09 DIAGNOSIS — S39.012A STRAIN OF LUMBAR REGION, INITIAL ENCOUNTER: Primary | ICD-10-CM

## 2021-08-09 PROCEDURE — 96372 THER/PROPH/DIAG INJ SC/IM: CPT

## 2021-08-09 PROCEDURE — 6360000002 HC RX W HCPCS: Performed by: STUDENT IN AN ORGANIZED HEALTH CARE EDUCATION/TRAINING PROGRAM

## 2021-08-09 PROCEDURE — 99283 EMERGENCY DEPT VISIT LOW MDM: CPT

## 2021-08-09 RX ORDER — OXYCODONE HYDROCHLORIDE AND ACETAMINOPHEN 5; 325 MG/1; MG/1
1 TABLET ORAL EVERY 6 HOURS PRN
Qty: 8 TABLET | Refills: 0 | Status: SHIPPED | OUTPATIENT
Start: 2021-08-09 | End: 2021-08-17

## 2021-08-09 RX ORDER — KETOROLAC TROMETHAMINE 30 MG/ML
30 INJECTION, SOLUTION INTRAMUSCULAR; INTRAVENOUS ONCE
Status: COMPLETED | OUTPATIENT
Start: 2021-08-09 | End: 2021-08-09

## 2021-08-09 RX ORDER — CEPHALEXIN 500 MG/1
500 CAPSULE ORAL 4 TIMES DAILY
Qty: 28 CAPSULE | Refills: 0 | Status: SHIPPED | OUTPATIENT
Start: 2021-08-09 | End: 2021-08-16

## 2021-08-09 RX ORDER — NAPROXEN 500 MG/1
500 TABLET ORAL 2 TIMES DAILY
COMMUNITY

## 2021-08-09 RX ADMIN — KETOROLAC TROMETHAMINE 30 MG: 30 INJECTION, SOLUTION INTRAMUSCULAR; INTRAVENOUS at 07:31

## 2021-08-09 ASSESSMENT — ENCOUNTER SYMPTOMS
BACK PAIN: 1
NAUSEA: 0
ABDOMINAL PAIN: 0
VOMITING: 0

## 2021-08-09 ASSESSMENT — PAIN SCALES - GENERAL
PAINLEVEL_OUTOF10: 9
PAINLEVEL_OUTOF10: 9

## 2021-08-09 ASSESSMENT — PAIN DESCRIPTION - LOCATION: LOCATION: BACK

## 2021-08-09 NOTE — ED PROVIDER NOTES
[address] [facility]      Pt Name: Gerardo Villegas  MRN: [id number]  [place] 1995  Date of evaluation: 8/9/2021  Provider: Sully Rios MD    CHIEF COMPLAINT       Chief Complaint   Patient presents with    Back Pain         HISTORY OF PRESENT ILLNESS   (Location/Symptom, Timing/Onset, Context/Setting, Quality, Duration, Modifying Factors, Severity)  Note limiting factors. Gerardo Villegas is a 23 y.o. female who presents to the emergency department with low back pain and flank pain x 1 week. Seen yesterday for same, underwent CT abd non con negative for nephrolithiasis. Urine infected appearing. Discharged with muscle relaxants and NSAIDs for back strain. Since then she lifted a heavy object and states that she felt a pop in her left back/flank region. Bending over lifting heavy objects makes the pain worse. She is not having any loss of bladder or bowel control. No numbness in her groin. No weakness or numbness in her legs. No history of IV drug use. No fever. No direct trauma to the back. Pain is currently moderate in nature. Not improving with muscle relaxants and NSAIDs. HPI    Nursing Notes were reviewed. REVIEW OF SYSTEMS    (2-9 systems for level 4, 10 or more for level 5)     Review of Systems   Constitutional: Negative for chills and fever. Gastrointestinal: Negative for abdominal pain, nausea and vomiting. Genitourinary:        Flank pain     Musculoskeletal: Positive for back pain. Negative for arthralgias. Skin: Negative for rash and wound. All other systems reviewed and are negative. Except as noted above the remainder of the review of systems was reviewed and negative.        PAST MEDICAL HISTORY     Past Medical History:   Diagnosis Date    Kidney stone          SURGICAL HISTORY       Past Surgical History:   Procedure Laterality Date    WRIST SURGERY      pin palcement         CURRENT MEDICATIONS       Previous Medications    LIDOCAINE (LIDODERM) 5 %    Place 1 patch onto the skin daily 12 hours on, 12 hours off. LISDEXAMFETAMINE (VYVANSE) 30 MG CAPSULE    Take 30 mg by mouth 2 times daily. METHOCARBAMOL (ROBAXIN-750) 750 MG TABLET    Take 1-2 tablets by mouth 3 times daily for 15 doses    NAPROXEN (NAPROSYN) 500 MG TABLET    Take 1 tablet by mouth 2 times daily (with meals)    NAPROXEN (NAPROSYN) 500 MG TABLET    Take 1 tablet by mouth 2 times daily as needed for Pain    NAPROXEN (NAPROSYN) 500 MG TABLET    Take 500 mg by mouth 2 times daily       ALLERGIES     Patient has no known allergies. FAMILY HISTORY     History reviewed. No pertinent family history. SOCIAL HISTORY       Social History     Socioeconomic History    Marital status: Single     Spouse name: None    Number of children: None    Years of education: None    Highest education level: None   Occupational History    None   Tobacco Use    Smoking status: Never Smoker    Smokeless tobacco: Never Used   Vaping Use    Vaping Use: Never used   Substance and Sexual Activity    Alcohol use: Not Currently    Drug use: Never    Sexual activity: None   Other Topics Concern    None   Social History Narrative    None     Social Determinants of Health     Financial Resource Strain:     Difficulty of Paying Living Expenses:    Food Insecurity:     Worried About Running Out of Food in the Last Year:     Ran Out of Food in the Last Year:    Transportation Needs:     Lack of Transportation (Medical):      Lack of Transportation (Non-Medical):    Physical Activity:     Days of Exercise per Week:     Minutes of Exercise per Session:    Stress:     Feeling of Stress :    Social Connections:     Frequency of Communication with Friends and Family:     Frequency of Social Gatherings with Friends and Family:     Attends Taoism Services:     Active Member of Clubs or Organizations:     Attends Club or Organization Meetings:     Marital Status:    Intimate Partner Violence:     Fear of Current or Ex-Partner:     Emotionally Abused:     Physically Abused:     Sexually Abused:        SCREENINGS                        PHYSICAL EXAM    (up to 7 for level 4, 8 or more for level 5)     ED Triage Vitals [08/09/21 0543]   BP Temp Temp Source Pulse Resp SpO2 Height Weight   (!) 150/91 97.7 °F (36.5 °C) Oral 82 20 99 % -- --       Physical Exam  Constitutional:       Appearance: Normal appearance. HENT:      Head: Normocephalic and atraumatic. Eyes:      Conjunctiva/sclera: Conjunctivae normal.   Pulmonary:      Effort: Pulmonary effort is normal.   Abdominal:      General: Abdomen is flat. There is no distension. Palpations: Abdomen is soft. There is no mass. Tenderness: There is no abdominal tenderness. Musculoskeletal:      Comments: Left CVA and paraspinal tenderness without fullness. No midline T or L-spine tenderness or step-off. Skin:     General: Skin is warm and dry. Capillary Refill: Capillary refill takes less than 2 seconds. Neurological:      Mental Status: She is alert. Comments:   Mentation normal.  5/5 extension and flexion in the hip, knee, ankle and toes of RLE and LLE. Sensation is intact and symmetric between RUE and LUE. Sensation is intact and symmetric between RLE and LLE. Babinski is down-going bilaterally, no clonus in the LEs.     Gait is steady and without abnormalities     Psychiatric:         Mood and Affect: Mood normal.         Behavior: Behavior normal.         DIAGNOSTIC RESULTS         RADIOLOGY:   Non-plain film images such as CT, Ultrasound and MRI are read by the radiologist. Plain radiographic images are visualized and preliminarily interpreted by the emergency physician with the below findings:      Interpretation per the Radiologist below, if available at the time of this note:    No orders to display         LABS:  Labs Reviewed - No data to display    All other labs were within normal range or not returned as of this dictation. EMERGENCY DEPARTMENT COURSE and DIFFERENTIAL DIAGNOSIS/MDM:   Vitals:    Vitals:    08/09/21 0543   BP: (!) 150/91   Pulse: 82   Resp: 20   Temp: 97.7 °F (36.5 °C)   TempSrc: Oral   SpO2: 99%     Medications   ketorolac (TORADOL) injection 30 mg (30 mg Intramuscular Given 8/9/21 0731)       Course and MDM:    Patient presents with low back pain and left-sided flank pain over the last 1 week. On my exam she appears comfortable. Hemodynamically stable. Seen for same 2 days ago. CT abdomen pelvis without contrast showed no kidney stone at that time. Her urine did appear infected. She does not have any alarm symptoms of cord compression today. Neurologic exam unremarkable as above. No midline T or L-spine tenderness or step-off no direct trauma to the back. Presentation not consistent with acute fracture dislocation or spinal cord compression. I will add antibiotics today for possible pyelonephritis as her urine does appear infected. I am suspecting back strain versus disc herniation without neurologic deficits. We will refer her to PCP for further pain control after short course of Percocet was provided today. She is agreeable to return for any loss of control of bowels or bladder, worsening pain or numbness or weakness in her legs. Given strict precautions not to drive or operate heavy machinery after taking Percocet. PROCEDURES:  Unless otherwise noted below, none     Procedures        FINAL IMPRESSION      1. Strain of lumbar region, initial encounter    2.  Urinary tract infection without hematuria, site unspecified          DISPOSITION/PLAN   DISPOSITION Decision To Discharge 08/09/2021 06:53:00 AM      PATIENT REFERRED TO:  Juvenal Trinh MD  [address] [place], [place] [address]  [facility] [address]  [phone]    In 1 week        DISCHARGE MEDICATIONS:      New Prescriptions    CEPHALEXIN (KEFLEX) 500 MG CAPSULE    Take 1 capsule by mouth 4 times daily for 7 days    OXYCODONE-ACETAMINOPHEN (PERCOCET) 5-325 MG PER TABLET    Take 1 tablet by mouth every 6 hours as needed for Pain for up to 8 days. Intended supply: 3 days. Take lowest dose possible to manage pain       Controlled Substances Monitoring:    If the patient was prescribed a controlled substance today, the PDMP was reviewed as documented below. No flowsheet data found.     (Please note that portions of this note were completed with a voice recognition program.  Efforts were made to edit the dictations but occasionally words are mis-transcribed.)    Ed Santiago MD (electronically signed)  Attending Emergency Physician         Shan Powers MD  08/09/21 0172

## 2021-08-09 NOTE — ED TRIAGE NOTES
Pt states she was seen yesterday for poss kidney stones and pain. Was dx with muscle strain and lifted something today and felt a \"pop\" in her low back. Denies loss of bowel/bladder control.  Pt ambulated to room w/o assistance

## 2021-08-10 LAB — URINE CULTURE, ROUTINE: NORMAL

## 2021-10-09 ENCOUNTER — HOSPITAL ENCOUNTER (EMERGENCY)
Age: 26
Discharge: HOME | End: 2021-10-09
Attending: EMERGENCY MEDICINE
Payer: COMMERCIAL

## 2021-10-09 VITALS
HEIGHT: 65 IN | SYSTOLIC BLOOD PRESSURE: 137 MMHG | BODY MASS INDEX: 48.82 KG/M2 | WEIGHT: 293 LBS | TEMPERATURE: 97.7 F | HEART RATE: 77 BPM | RESPIRATION RATE: 14 BRPM | OXYGEN SATURATION: 97 % | DIASTOLIC BLOOD PRESSURE: 79 MMHG

## 2021-10-09 DIAGNOSIS — K04.7 DENTAL INFECTION: ICD-10-CM

## 2021-10-09 DIAGNOSIS — K02.9 PAIN DUE TO DENTAL CARIES: Primary | ICD-10-CM

## 2021-10-09 PROCEDURE — 6370000000 HC RX 637 (ALT 250 FOR IP): Performed by: EMERGENCY MEDICINE

## 2021-10-09 PROCEDURE — 64400 NJX AA&/STRD TRIGEMINAL NRV: CPT

## 2021-10-09 PROCEDURE — 99284 EMERGENCY DEPT VISIT MOD MDM: CPT

## 2021-10-09 RX ORDER — PENICILLIN V POTASSIUM 250 MG/1
500 TABLET ORAL ONCE
Status: COMPLETED | OUTPATIENT
Start: 2021-10-09 | End: 2021-10-09

## 2021-10-09 RX ORDER — HYDROCODONE BITARTRATE AND ACETAMINOPHEN 5; 325 MG/1; MG/1
1 TABLET ORAL EVERY 8 HOURS PRN
Qty: 5 TABLET | Refills: 0 | Status: SHIPPED | OUTPATIENT
Start: 2021-10-09 | End: 2021-10-11

## 2021-10-09 RX ORDER — ACETAMINOPHEN 325 MG/1
650 TABLET ORAL ONCE
Status: COMPLETED | OUTPATIENT
Start: 2021-10-09 | End: 2021-10-09

## 2021-10-09 RX ORDER — HYDROCODONE BITARTRATE AND ACETAMINOPHEN 5; 325 MG/1; MG/1
1 TABLET ORAL ONCE
Status: COMPLETED | OUTPATIENT
Start: 2021-10-09 | End: 2021-10-09

## 2021-10-09 RX ORDER — PENICILLIN V POTASSIUM 500 MG/1
500 TABLET ORAL 4 TIMES DAILY
Qty: 40 TABLET | Refills: 0 | Status: SHIPPED | OUTPATIENT
Start: 2021-10-09 | End: 2021-10-19

## 2021-10-09 RX ADMIN — HYDROCODONE BITARTRATE AND ACETAMINOPHEN 1 TABLET: 5; 325 TABLET ORAL at 05:22

## 2021-10-09 RX ADMIN — PENICILLIN V POTASSIUM 500 MG: 250 TABLET, FILM COATED ORAL at 05:22

## 2021-10-09 RX ADMIN — ACETAMINOPHEN 650 MG: 325 TABLET ORAL at 05:22

## 2021-10-09 ASSESSMENT — ENCOUNTER SYMPTOMS
EYE PAIN: 0
ABDOMINAL PAIN: 0
VOMITING: 0
SINUS PAIN: 0
NAUSEA: 0
SORE THROAT: 0
SHORTNESS OF BREATH: 0
COLOR CHANGE: 0
EYE REDNESS: 0
TROUBLE SWALLOWING: 0
PHOTOPHOBIA: 0

## 2021-10-09 ASSESSMENT — PAIN DESCRIPTION - LOCATION: LOCATION: TEETH

## 2021-10-09 ASSESSMENT — PAIN SCALES - GENERAL
PAINLEVEL_OUTOF10: 4
PAINLEVEL_OUTOF10: 10
PAINLEVEL_OUTOF10: 4

## 2021-10-09 NOTE — ED PROVIDER NOTES
[address] [facility]  EMERGENCY DEPARTMENT ENCOUNTER        Pt Name: Babita Cassidy  MRN: [id number]  [place] 1995  Date of evaluation: 10/9/2021  Provider: Lazarus Fonseca MD  PCP: Peg Adkins MD      [address] [place] [address]       Chief Complaint   Patient presents with    Dental Pain     pt reports that she has a cracked molar that started bothering her last week however woke her up around 2 am this morning. HISTORY OFPRESENT ILLNESS   (Location/Symptom, Timing/Onset, Context/Setting, Quality, Duration, Modifying Factors,Severity)  Note limiting factors. Babita Cassidy is a 23 y.o. female presenting today due to concern for left lower jaw pain related to a \"cracked molar\" that woke her up around 2 AM this morning with severe pain. She has had some intermittent discomfort over the last week but it became much worse this morning. She tried naproxen prior to coming to the ED but states that was not helping. She denies any neck swelling. No visual changes. If she tries to close her teeth tightly she will have pain but otherwise denies any pain with mouth movement. No chest pain or shortness of breath. No fever or chills. No trouble swallowing. No nausea or vomiting. She did try using over-the-counter tooth sealant but did not have much success with this. Due to concern for persistent dental pain, she came to the ED for further evaluation. She states that she can call her dentist on Monday but since it was the weekend, she came to the ED. REVIEW OF SYSTEMS    (2-9 systems for level 4, 10 or more for level 5)     Review of Systems   Constitutional: Negative for activity change, appetite change, chills, diaphoresis, fatigue and fever. HENT: Positive for dental problem (left lower molar). Negative for congestion, ear pain, sinus pain, sore throat and trouble swallowing. Eyes: Negative for photophobia, pain, redness and visual disturbance.    Respiratory: Negative for shortness of breath. Cardiovascular: Negative for chest pain. Gastrointestinal: Negative for abdominal pain, nausea and vomiting. Musculoskeletal: Negative for neck pain and neck stiffness. Skin: Negative for color change and rash. Neurological: Negative for weakness, numbness and headaches (only pain to jaw). Psychiatric/Behavioral: Negative for confusion. The patient is not nervous/anxious. Positives and Pertinent negatives as per HPI. PASTMEDICAL HISTORY     Past Medical History:   Diagnosis Date    Kidney stone          SURGICAL HISTORY       Past Surgical History:   Procedure Laterality Date    WRIST SURGERY      pin palcement         CURRENT MEDICATIONS       Discharge Medication List as of 10/9/2021  5:39 AM      CONTINUE these medications which have NOT CHANGED    Details   lisdexamfetamine (VYVANSE) 30 MG capsule Take 30 mg by mouth 2 times daily. Historical Med      !! naproxen (NAPROSYN) 500 MG tablet Take 500 mg by mouth 2 times dailyHistorical Med      !! naproxen (NAPROSYN) 500 MG tablet Take 1 tablet by mouth 2 times daily as needed for Pain, Disp-20 tablet, R-0Print      lidocaine (LIDODERM) 5 % Place 1 patch onto the skin daily 12 hours on, 12 hours off., Disp-30 patch, R-0Print      !! naproxen (NAPROSYN) 500 MG tablet Take 1 tablet by mouth 2 times daily (with meals), Disp-30 tablet, R-0Print       !! - Potential duplicate medications found. Please discuss with provider. ALLERGIES     Patient has no known allergies. FAMILY HISTORY     History reviewed. No pertinent family history.        SOCIAL HISTORY       Social History     Socioeconomic History    Marital status: Single     Spouse name: None    Number of children: None    Years of education: None    Highest education level: None   Occupational History    None   Tobacco Use    Smoking status: Never Smoker    Smokeless tobacco: Never Used   Vaping Use    Vaping Use: Never used Substance and Sexual Activity    Alcohol use: Not Currently    Drug use: Never    Sexual activity: None   Other Topics Concern    None   Social History Narrative    None     Social Determinants of Health     Financial Resource Strain:     Difficulty of Paying Living Expenses:    Food Insecurity:     Worried About Running Out of Food in the Last Year:     Ran Out of Food in the Last Year:    Transportation Needs:     Lack of Transportation (Medical):  Lack of Transportation (Non-Medical):    Physical Activity:     Days of Exercise per Week:     Minutes of Exercise per Session:    Stress:     Feeling of Stress :    Social Connections:     Frequency of Communication with Friends and Family:     Frequency of Social Gatherings with Friends and Family:     Attends Zoroastrianism Services:     Active Member of Clubs or Organizations:     Attends Club or Organization Meetings:     Marital Status:    Intimate Partner Violence:     Fear of Current or Ex-Partner:     Emotionally Abused:     Physically Abused:     Sexually Abused:        SCREENINGS                PHYSICAL EXAM    (up to 7 for level 4, 8 or more for level 5)     ED Triage Vitals [10/09/21 0407]   BP Temp Temp Source Pulse Resp SpO2 Height Weight   137/79 97.7 °F (36.5 °C) Oral 77 14 97 % 5' 5\" (1.651 m) (!) 301 lb (136.5 kg)       Physical Exam  Vitals and nursing note reviewed. Constitutional:       General: She is awake. She is not in acute distress. Appearance: Normal appearance. She is well-developed and well-groomed. She is morbidly obese. She is not ill-appearing, toxic-appearing or diaphoretic. Interventions: She is not intubated. HENT:      Head: Normocephalic and atraumatic. No raccoon eyes, Rowe's sign, abrasion, contusion, masses, right periorbital erythema, left periorbital erythema or laceration. Hair is normal.      Jaw: There is normal jaw occlusion. Tenderness present.  No trismus, swelling, pain on movement or malocclusion. Right Ear: External ear normal. No mastoid tenderness. Left Ear: External ear normal. No mastoid tenderness. Nose: Nose normal. No laceration, mucosal edema, congestion or rhinorrhea. Right Sinus: No maxillary sinus tenderness or frontal sinus tenderness. Left Sinus: No maxillary sinus tenderness or frontal sinus tenderness. Mouth/Throat:      Lips: Pink. No lesions. Mouth: Mucous membranes are moist. No injury, lacerations, oral lesions or angioedema. Dentition: Abnormal dentition. Dental tenderness and dental caries present. No dental abscesses. Tongue: No lesions. Tongue does not deviate from midline. Palate: No mass and lesions. Pharynx: Oropharynx is clear. Uvula midline. No pharyngeal swelling, oropharyngeal exudate, posterior oropharyngeal erythema or uvula swelling. Eyes:      General: No scleral icterus. Right eye: No discharge. Left eye: No discharge. Extraocular Movements: Extraocular movements intact. Conjunctiva/sclera: Conjunctivae normal.      Pupils: Pupils are equal, round, and reactive to light. Neck:      Thyroid: No thyroid tenderness. Vascular: No JVD. Trachea: Trachea and phonation normal. No tracheal tenderness. Comments: No nuchal rigidity   Cardiovascular:      Rate and Rhythm: Normal rate and regular rhythm. Pulses: Normal pulses. Pulmonary:      Effort: Pulmonary effort is normal. No tachypnea, bradypnea, accessory muscle usage, prolonged expiration, respiratory distress or retractions. She is not intubated. Breath sounds: Normal breath sounds and air entry. No stridor. Musculoskeletal:         General: No deformity. Cervical back: Full passive range of motion without pain, normal range of motion and neck supple. No edema, erythema, signs of trauma, rigidity, torticollis or crepitus.  No pain with movement, spinous process tenderness or muscular tenderness. Normal range of motion. Lymphadenopathy:      Head:      Right side of head: No submental, submandibular, tonsillar, preauricular, posterior auricular or occipital adenopathy. Left side of head: Submandibular adenopathy present. No tonsillar, preauricular, posterior auricular or occipital adenopathy. Cervical: Cervical adenopathy present. Right cervical: No superficial cervical adenopathy. Left cervical: Superficial cervical adenopathy present. Skin:     General: Skin is warm and dry. Coloration: Skin is not jaundiced or pale. Findings: No bruising, erythema (no facial or neck erythema) or rash. Neurological:      General: No focal deficit present. Mental Status: She is alert and oriented to person, place, and time. Mental status is at baseline. GCS: GCS eye subscore is 4. GCS verbal subscore is 5. GCS motor subscore is 6. Cranial Nerves: No dysarthria or facial asymmetry. Sensory: Sensation is intact. Motor: Motor function is intact. No weakness, tremor, atrophy, abnormal muscle tone or seizure activity. Psychiatric:         Attention and Perception: Attention normal.         Mood and Affect: Mood and affect normal.         Speech: Speech normal. Speech is not slurred. Behavior: Behavior normal. Behavior is cooperative. DIAGNOSTIC RESULTS   :    Labs Reviewed - No data to display    All other labs were within normal range or not returned asof this dictation. EKG:  All EKG's are interpreted by the Emergency Department Physician who either signs or Co-signs this chart in the absence of a cardiologist.        RADIOLOGY:   Non-plain film images such as CT, Ultrasound and MRI are read by the radiologist. Plainradiographic images are visualized and preliminarily interpreted by the  ED Provider with the belowfindings:        Interpretation per the Radiologist below, if available at the time of this note:    No orders to display PROCEDURES   Unless otherwise noted below, none     Dental Nerve Block    Date/Time: 10/9/2021 2:38 PM  Performed by: Tone Arriaza MD  Authorized by: Tone Arriaza MD     Consent:     Consent obtained:  Verbal    Consent given by:  Patient    Risks discussed: Allergic reaction, infection, nerve damage, unsuccessful block, pain and hematoma    Alternatives discussed:  No treatment and alternative treatment  Indications:     Indications: dental pain    Location:     Anesthesia block type: single tooth block   Procedure details (see MAR for exact dosages):     Syringe type:  Luer lock syringe    Needle gauge:  30 G    Anesthetic injected:  Lidocaine 1% w/o epi    Injection procedure:  Anatomic landmarks identified, introduced needle, incremental injection, anatomic landmarks palpated and negative aspiration for blood  Post-procedure details:     Outcome:  Pain improved    Patient tolerance of procedure: Tolerated well, no immediate complications        CRITICAL CARE TIME   N/A    CONSULTS:  None    EMERGENCY DEPARTMENT COURSE and DIFFERENTIAL DIAGNOSIS/MDM:   Vitals:    Vitals:    10/09/21 0407   BP: 137/79   Pulse: 77   Resp: 14   Temp: 97.7 °F (36.5 °C)   TempSrc: Oral   SpO2: 97%   Weight: (!) 301 lb (136.5 kg)   Height: 5' 5\" (1.651 m)       Patient was given the following medications:  Medications   HYDROcodone-acetaminophen (NORCO) 5-325 MG per tablet 1 tablet (1 tablet Oral Given 10/9/21 0522)   penicillin v potassium (VEETID) tablet 500 mg (500 mg Oral Given 10/9/21 0522)   acetaminophen (TYLENOL) tablet 650 mg (650 mg Oral Given 10/9/21 0522)     Patient was evaluated a concern for worsening dental pain over the last week but much worse when it woke her up this morning. I did not see any obvious dental abscess during evaluation and there was no neck swelling and at this time I have low suspicion for any serious pathology such as Jerald's angina. She did not have any trismus or airway concerns. TO:  [facility] [place] [facility]  [address] [facility] [address]  [phone]  Go to   If symptoms worsen    Mateo Oscar MD  [address] [facility]  [phone]    Call   As needed    Your dentist    Schedule an appointment as soon as possible for a visit in 3 days  For further evaluation      DISCHARGEMEDICATIONS:  Discharge Medication List as of 10/9/2021  5:39 AM      START taking these medications    Details   penicillin v potassium (VEETID) 500 MG tablet Take 1 tablet by mouth 4 times daily for 10 days, Disp-40 tablet, R-0Print      HYDROcodone-acetaminophen (NORCO) 5-325 MG per tablet Take 1 tablet by mouth every 8 hours as needed for Pain (breakthrough pain, do not drive with this) for up to 2 days. , Disp-5 tablet, R-0Print             DISCONTINUED MEDICATIONS:  Discharge Medication List as of 10/9/2021  5:39 AM                 (Please note that portions of this note were completed with a voicerecognition program.  Efforts were made to edit the dictations but occasionally words are mis-transcribed.)    Pacheco Hernandez MD (electronically signed)            Pacheco Hernandez MD  10/09/21 0067

## 2021-10-09 NOTE — Clinical Note
Erick Salinas was seen and treated in our emergency department on 10/9/2021. She may return to work on 10/10/2021. If you have any questions or concerns, please don't hesitate to call.       Chip Kapadia RN

## 2024-06-17 ENCOUNTER — HOSPITAL ENCOUNTER (EMERGENCY)
Age: 29
Discharge: HOME | End: 2024-06-17
Payer: COMMERCIAL

## 2024-06-17 VITALS
BODY MASS INDEX: 48.82 KG/M2 | TEMPERATURE: 98 F | OXYGEN SATURATION: 98 % | RESPIRATION RATE: 16 BRPM | HEIGHT: 65 IN | HEART RATE: 98 BPM | DIASTOLIC BLOOD PRESSURE: 96 MMHG | WEIGHT: 293 LBS | SYSTOLIC BLOOD PRESSURE: 155 MMHG

## 2024-06-17 DIAGNOSIS — K08.89 PAIN, DENTAL: Primary | ICD-10-CM

## 2024-06-17 PROCEDURE — 99283 EMERGENCY DEPT VISIT LOW MDM: CPT

## 2024-06-17 RX ORDER — CLINDAMYCIN HYDROCHLORIDE 300 MG/1
300 CAPSULE ORAL 3 TIMES DAILY
Qty: 30 CAPSULE | Refills: 0 | Status: SHIPPED | OUTPATIENT
Start: 2024-06-17 | End: 2024-06-27

## 2024-06-17 NOTE — ED PROVIDER NOTES
[facility]  ED  Emergency Department Encounter    Patient Name: Gisel Beauchamp  MRN: [id number]  YOB: 1995  Date of Evaluation: 6/17/2024  Provider: Juliann Leonardo MD  Note Started: 4:48 PM EDT 6/17/24    CHIEF COMPLAINT  Dental Pain (Pt to er , dentist placed amoxicillin on Thursday.. infection appears to be getting , no with some facial swelling)    SHARED SERVICE VISIT  Evaluated by ROSIE.  My supervising physician was available for consultation.     HISTORY OF PRESENT ILLNESS  Gisel Beauchamp is a 28 y.o. female who presents to the ED ongoing dental discomfort.  Patient states that she was started on amoxicillin last week for dental pain and infection.  States that several days then pain and swelling became worse although are now improving although still persistent.  States that she was informed she needs a root canal although at this time insurance not covering it and she is unable to afford it.  She denies any fevers chills.  Or vomiting.  No headaches or dizziness.  No chest pain or shortness of breath.  Non-smoker..    No other complaints, modifying factors or associated symptoms.     Nursing notes reviewed were all reviewed and agreed with or any disagreements were addressed in the HPI.    PMH:  Past Medical History:   Diagnosis Date    Kidney stone      Surgical History:  Past Surgical History:   Procedure Laterality Date    WRIST SURGERY      pin palcement     Family History:  History reviewed. No pertinent family history.    Social History:  Social History     Socioeconomic History    Marital status: Single     Spouse name: Not on file    Number of children: Not on file    Years of education: Not on file    Highest education level: Not on file   Occupational History    Not on file   Tobacco Use    Smoking status: Never    Smokeless tobacco: Never   Vaping Use    Vaping Use: Never used   Substance and Sexual Activity    Alcohol use: Not Currently    Drug use:

## 2024-06-17 NOTE — DISCHARGE INSTRUCTIONS
Toothache    Toothaches are generally caused by tooth decay.  If you have severe pain or swelling around a tooth, you may have a deep tooth infection.  Tooth decay and infections require evaluation and treatment by a dentist or an oral surgeon.    The emergency department will provide you with the best possible care available for your dental problem.  Unfortunately, there is not a dentist or dental clinic available in the department.  Routine dental care, such as fillings, tooth extractions, or bibi canals are not available in the emergency department.  However, we are avaialbe for emergencies including abscesses, fractures of the jaw and other oral trauma such as a tooth that is knocked out.  Any other dental problem is best treated by a dentist.  Please see the list of dental clinics in the area if you do not currently have a dentist.      Treatment That Can Be Provided for Toothache in the Emergency Department    If you have a severe toothache, medication may be prescribed for you until you are able to see a dentist.  If you are given an antibiotic, take it as prescribed and continue to take it until gone.  Even if you start to feel better, your toothache will need to be treated by a dentist or an oral surgeon.    Pain medication may be prescribed for you.  As is the policy of the [facility], the emergency department uses nonsteroidal anti-inflammatory medication (NSAIDs) as the primary medication for pain.  These may include ibuprofen (Motrin®) or naproxyn sodium (Naprosyn®).    Patients who are unable to take nonsteroidal anti-inflammatory medications will generally be advised to take acetaminophen (Tylenol®) for dental pain.    As is the policy of the [facility] dental clinics, the [facility] emergency department policy strongly discourages the use of the narcotic medications. (Tylenol #3®, Vicodin®, etc) are restricted by specific, strict guidelines.    If you have any